# Patient Record
Sex: FEMALE | Race: OTHER | HISPANIC OR LATINO | URBAN - METROPOLITAN AREA
[De-identification: names, ages, dates, MRNs, and addresses within clinical notes are randomized per-mention and may not be internally consistent; named-entity substitution may affect disease eponyms.]

---

## 2024-09-11 ENCOUNTER — INPATIENT (INPATIENT)
Facility: HOSPITAL | Age: 59
LOS: 8 days | Discharge: NURSING, REHAB OR LONG-TERM CARE | DRG: 948 | End: 2024-09-20
Attending: STUDENT IN AN ORGANIZED HEALTH CARE EDUCATION/TRAINING PROGRAM | Admitting: STUDENT IN AN ORGANIZED HEALTH CARE EDUCATION/TRAINING PROGRAM
Payer: MEDICARE

## 2024-09-11 VITALS
SYSTOLIC BLOOD PRESSURE: 149 MMHG | TEMPERATURE: 98 F | OXYGEN SATURATION: 95 % | HEART RATE: 83 BPM | RESPIRATION RATE: 17 BRPM | WEIGHT: 192.9 LBS | HEIGHT: 59 IN | DIASTOLIC BLOOD PRESSURE: 77 MMHG

## 2024-09-11 DIAGNOSIS — E11.9 TYPE 2 DIABETES MELLITUS WITHOUT COMPLICATIONS: ICD-10-CM

## 2024-09-11 DIAGNOSIS — R41.82 ALTERED MENTAL STATUS, UNSPECIFIED: ICD-10-CM

## 2024-09-11 DIAGNOSIS — F25.9 SCHIZOAFFECTIVE DISORDER, UNSPECIFIED: ICD-10-CM

## 2024-09-11 DIAGNOSIS — G93.41 METABOLIC ENCEPHALOPATHY: ICD-10-CM

## 2024-09-11 DIAGNOSIS — Z29.9 ENCOUNTER FOR PROPHYLACTIC MEASURES, UNSPECIFIED: ICD-10-CM

## 2024-09-11 DIAGNOSIS — I10 ESSENTIAL (PRIMARY) HYPERTENSION: ICD-10-CM

## 2024-09-11 LAB
ALBUMIN SERPL ELPH-MCNC: 3 G/DL — LOW (ref 3.5–5)
ALP SERPL-CCNC: 59 U/L — SIGNIFICANT CHANGE UP (ref 40–120)
ALT FLD-CCNC: 25 U/L DA — SIGNIFICANT CHANGE UP (ref 10–60)
ANION GAP SERPL CALC-SCNC: 5 MMOL/L — SIGNIFICANT CHANGE UP (ref 5–17)
APPEARANCE UR: CLEAR — SIGNIFICANT CHANGE UP
APTT BLD: 30.9 SEC — SIGNIFICANT CHANGE UP (ref 24.5–35.6)
AST SERPL-CCNC: 10 U/L — SIGNIFICANT CHANGE UP (ref 10–40)
BASOPHILS # BLD AUTO: 0.03 K/UL — SIGNIFICANT CHANGE UP (ref 0–0.2)
BASOPHILS NFR BLD AUTO: 0.3 % — SIGNIFICANT CHANGE UP (ref 0–2)
BILIRUB SERPL-MCNC: 0.2 MG/DL — SIGNIFICANT CHANGE UP (ref 0.2–1.2)
BILIRUB UR-MCNC: NEGATIVE — SIGNIFICANT CHANGE UP
BUN SERPL-MCNC: 16 MG/DL — SIGNIFICANT CHANGE UP (ref 7–18)
CALCIUM SERPL-MCNC: 9 MG/DL — SIGNIFICANT CHANGE UP (ref 8.4–10.5)
CHLORIDE SERPL-SCNC: 112 MMOL/L — HIGH (ref 96–108)
CO2 SERPL-SCNC: 28 MMOL/L — SIGNIFICANT CHANGE UP (ref 22–31)
COLOR SPEC: YELLOW — SIGNIFICANT CHANGE UP
CREAT SERPL-MCNC: 0.66 MG/DL — SIGNIFICANT CHANGE UP (ref 0.5–1.3)
DIFF PNL FLD: NEGATIVE — SIGNIFICANT CHANGE UP
EGFR: 102 ML/MIN/1.73M2 — SIGNIFICANT CHANGE UP
EOSINOPHIL # BLD AUTO: 0.04 K/UL — SIGNIFICANT CHANGE UP (ref 0–0.5)
EOSINOPHIL NFR BLD AUTO: 0.5 % — SIGNIFICANT CHANGE UP (ref 0–6)
GLUCOSE BLDC GLUCOMTR-MCNC: 114 MG/DL — HIGH (ref 70–99)
GLUCOSE BLDC GLUCOMTR-MCNC: 96 MG/DL — SIGNIFICANT CHANGE UP (ref 70–99)
GLUCOSE SERPL-MCNC: 122 MG/DL — HIGH (ref 70–99)
GLUCOSE UR QL: 500 MG/DL
HCG SERPL-ACNC: <1 MIU/ML — SIGNIFICANT CHANGE UP
HCT VFR BLD CALC: 44 % — SIGNIFICANT CHANGE UP (ref 34.5–45)
HGB BLD-MCNC: 14.8 G/DL — SIGNIFICANT CHANGE UP (ref 11.5–15.5)
IMM GRANULOCYTES NFR BLD AUTO: 0.5 % — SIGNIFICANT CHANGE UP (ref 0–0.9)
INR BLD: 0.97 RATIO — SIGNIFICANT CHANGE UP (ref 0.85–1.18)
KETONES UR-MCNC: NEGATIVE MG/DL — SIGNIFICANT CHANGE UP
LEUKOCYTE ESTERASE UR-ACNC: NEGATIVE — SIGNIFICANT CHANGE UP
LYMPHOCYTES # BLD AUTO: 2.02 K/UL — SIGNIFICANT CHANGE UP (ref 1–3.3)
LYMPHOCYTES # BLD AUTO: 22.8 % — SIGNIFICANT CHANGE UP (ref 13–44)
MAGNESIUM SERPL-MCNC: 1.9 MG/DL — SIGNIFICANT CHANGE UP (ref 1.6–2.6)
MCHC RBC-ENTMCNC: 27.7 PG — SIGNIFICANT CHANGE UP (ref 27–34)
MCHC RBC-ENTMCNC: 33.6 GM/DL — SIGNIFICANT CHANGE UP (ref 32–36)
MCV RBC AUTO: 82.2 FL — SIGNIFICANT CHANGE UP (ref 80–100)
MONOCYTES # BLD AUTO: 0.62 K/UL — SIGNIFICANT CHANGE UP (ref 0–0.9)
MONOCYTES NFR BLD AUTO: 7 % — SIGNIFICANT CHANGE UP (ref 2–14)
NEUTROPHILS # BLD AUTO: 6.1 K/UL — SIGNIFICANT CHANGE UP (ref 1.8–7.4)
NEUTROPHILS NFR BLD AUTO: 68.9 % — SIGNIFICANT CHANGE UP (ref 43–77)
NITRITE UR-MCNC: NEGATIVE — SIGNIFICANT CHANGE UP
NRBC # BLD: 0 /100 WBCS — SIGNIFICANT CHANGE UP (ref 0–0)
PH UR: 7 — SIGNIFICANT CHANGE UP (ref 5–8)
PLATELET # BLD AUTO: 293 K/UL — SIGNIFICANT CHANGE UP (ref 150–400)
POTASSIUM SERPL-MCNC: 3.4 MMOL/L — LOW (ref 3.5–5.3)
POTASSIUM SERPL-SCNC: 3.4 MMOL/L — LOW (ref 3.5–5.3)
PROT SERPL-MCNC: 6.8 G/DL — SIGNIFICANT CHANGE UP (ref 6–8.3)
PROT UR-MCNC: NEGATIVE MG/DL — SIGNIFICANT CHANGE UP
PROTHROM AB SERPL-ACNC: 11.1 SEC — SIGNIFICANT CHANGE UP (ref 9.5–13)
RBC # BLD: 5.35 M/UL — HIGH (ref 3.8–5.2)
RBC # FLD: 16.1 % — HIGH (ref 10.3–14.5)
SODIUM SERPL-SCNC: 145 MMOL/L — SIGNIFICANT CHANGE UP (ref 135–145)
SP GR SPEC: 1.02 — SIGNIFICANT CHANGE UP (ref 1–1.03)
TROPONIN I, HIGH SENSITIVITY RESULT: 5.1 NG/L — SIGNIFICANT CHANGE UP
UROBILINOGEN FLD QL: 0.2 MG/DL — SIGNIFICANT CHANGE UP (ref 0.2–1)
WBC # BLD: 8.85 K/UL — SIGNIFICANT CHANGE UP (ref 3.8–10.5)
WBC # FLD AUTO: 8.85 K/UL — SIGNIFICANT CHANGE UP (ref 3.8–10.5)

## 2024-09-11 PROCEDURE — 99285 EMERGENCY DEPT VISIT HI MDM: CPT

## 2024-09-11 PROCEDURE — 99223 1ST HOSP IP/OBS HIGH 75: CPT | Mod: GC

## 2024-09-11 PROCEDURE — 70450 CT HEAD/BRAIN W/O DYE: CPT | Mod: 26,MC

## 2024-09-11 RX ORDER — ENOXAPARIN SODIUM 100 MG/ML
40 INJECTION SUBCUTANEOUS EVERY 24 HOURS
Refills: 0 | Status: DISCONTINUED | OUTPATIENT
Start: 2024-09-11 | End: 2024-09-20

## 2024-09-11 RX ORDER — FLU VACCINE TS 2012-2013(5YR+) 45MCG/.5ML
0.5 VIAL (ML) INTRAMUSCULAR ONCE
Refills: 0 | Status: DISCONTINUED | OUTPATIENT
Start: 2024-09-11 | End: 2024-09-20

## 2024-09-11 RX ORDER — LOSARTAN POTASSIUM 50 MG/1
1 TABLET ORAL
Refills: 0 | DISCHARGE

## 2024-09-11 RX ORDER — LOSARTAN POTASSIUM 50 MG/1
50 TABLET ORAL DAILY
Refills: 0 | Status: DISCONTINUED | OUTPATIENT
Start: 2024-09-11 | End: 2024-09-20

## 2024-09-11 RX ORDER — POTASSIUM CHLORIDE 10 MEQ
40 TABLET, EXT RELEASE, PARTICLES/CRYSTALS ORAL ONCE
Refills: 0 | Status: COMPLETED | OUTPATIENT
Start: 2024-09-11 | End: 2024-09-11

## 2024-09-11 RX ADMIN — Medication 40 MILLIEQUIVALENT(S): at 16:00

## 2024-09-11 RX ADMIN — Medication 40 MILLIGRAM(S): at 21:54

## 2024-09-11 RX ADMIN — Medication 80 MILLILITER(S): at 17:08

## 2024-09-11 RX ADMIN — ENOXAPARIN SODIUM 40 MILLIGRAM(S): 100 INJECTION SUBCUTANEOUS at 17:08

## 2024-09-11 RX ADMIN — LOSARTAN POTASSIUM 50 MILLIGRAM(S): 50 TABLET ORAL at 17:08

## 2024-09-11 NOTE — H&P ADULT - PROBLEM SELECTOR PLAN 1
p/w worsening confusion for the past two weeks.  Vitals: temp 98.4, HR 83, /77   CTH neg   EKG: NSR   f/u UA  c/w iv fluids as per poor oral intake  likely worsening progression of schizoaffective disorder but rule out urinary retention vs infection.   neurology consulted: Paula   psych consulted: Mynor p/w worsening confusion for the past two weeks.  Vitals: temp 98.4, HR 83, /77   CTH neg   EKG: NSR   f/u UA  c/w iv fluids as per poor oral intake  likely worsening progression of schizoaffective disorder but rule out urinary retention vs infection.   Neurology consulted: Paula   Psych consulted: Mynor NICOLE consulted: As family can no longer take care of the patient   PT consulted: As family reports she is more unbalanced.

## 2024-09-11 NOTE — ED ADULT NURSE NOTE - OBJECTIVE STATEMENT
Patient came to the ED alert but confused and accompanied by grand daughter at the bedside for increased confusion and shaking. As per grand daughter the patient was hospitalized lately for schizophrenic behavior. Blood work was sent and pending urine.

## 2024-09-11 NOTE — ED PROVIDER NOTE - CROS ED GI ALL NEG
negative...
87 y/o female admitted to [facility] for acute on chronic CHF exacerbation with possible concomitant CAP

## 2024-09-11 NOTE — H&P ADULT - NSHPPHYSICALEXAM_GEN_ALL_CORE
T(C): 36.6 (09-11-24 @ 15:28), Max: 36.9 (09-11-24 @ 12:52)  HR: 69 (09-11-24 @ 15:28) (69 - 83)  BP: 127/75 (09-11-24 @ 15:28) (127/75 - 149/77)  RR: 18 (09-11-24 @ 15:28) (17 - 18)  SpO2: 95% (09-11-24 @ 15:28) (95% - 95%)    CONSTITUTIONAL: Well groomed, no apparent distress  EYES: PERRLA and symmetric, EOMI, No conjunctival or scleral injection, non-icteric  RESP: No respiratory distress, no use of accessory muscles; CTA b/l, no WRR  CV: RRR, +S1S2, no MRG; no JVD; no peripheral edema  GI: Soft, NT, ND, no rebound, no guarding; no palpable masses; no hepatosplenomegaly; no hernia palpated  LYMPH: No cervical LAD or tenderness; no axillary LAD or tenderness; no inguinal LAD or tenderness  MSK: Normal gait; No digital clubbing or cyanosis; examination of the (head/neck/spine/ribs/pelvis, RUE, LUE, RLE, LLE) without misalignment,            Normal ROM without pain, no spinal tenderness, normal muscle strength/tone  SKIN: No rashes or ulcers noted; no subcutaneous nodules or induration palpable  NEURO: CN II-XII intact; normal reflexes in upper and lower extremities, sensation intact in upper and lower extremities b/l to light touch   PSYCH: A+O x 1, mood and affect appropriate,

## 2024-09-11 NOTE — ED PROVIDER NOTE - CLINICAL SUMMARY MEDICAL DECISION MAKING FREE TEXT BOX
58-year-old female presents the ED with altered mental status and confusion for the past 2 weeks.  As per daughter patient also not eating or drinking and now soiling herself.  This is a change from her baseline.  Will get labs, head CT, UA, reassess.  Anticipate admission

## 2024-09-11 NOTE — ED ADULT TRIAGE NOTE - CHIEF COMPLAINT QUOTE
shaking and confusion started 2 weeks ago when she was in [place], traveled back to [place] on [place] 5th.

## 2024-09-11 NOTE — H&P ADULT - ATTENDING COMMENTS
58F with a past medical  history of diabetes, hypertension, bipolar, and schizophrenia presents to ED for confusion for the past 2 weeks. Patient is being admitted for metabolic encephelopathy of unknown origin.    #Toxic metabolic encephalopathy - unclear etiology  #Bipolar Schizophrenia  #HTN, DM  History obtained by granddaughter at bedside. Patient with history of bipolar and schizophrenia and has been on oral medication many years ago but has been off of them. She has started to get injections of Paliperidone palmitate 156 Mg/ml at [facility] but with no significant improvement. Patient normal able to verbally communicate and do some ADLs but now minimally verbal but able to follow basic commands and answer yes and no questions. Patient has been soiling herself now. [place] was [place] [facility] [place] a month, reportedly had 2 episodes of seizure like activity in succession but family did not seek medical care at the time. Patient was seen in clinic in [place] who told family to bring patient to hospital. Unclear etiology for worsening mental status but able to follow commands and no focal neurologic deficits on exam. Unclear if any neurologic etiologies, such as encephalitis. May be worsening psychiatric status vs early dementia.   - Neurology consult for further workup  - Psychiatry consulted  - CBC/BMP neg  - obtain UA with straight cath  - continue home HTN, HLD  - PT - needs help with ambulation  - SW/CM consult - family worried about taking care of patient at home  - DVT ppx

## 2024-09-11 NOTE — H&P ADULT - NSICDXPASTMEDICALHX_GEN_ALL_CORE_FT
PAST MEDICAL HISTORY:  Bipolar disorder     Diabetes     HLD (hyperlipidemia)     HTN (hypertension)

## 2024-09-11 NOTE — PATIENT PROFILE ADULT - FALL HARM RISK - HARM RISK INTERVENTIONS

## 2024-09-11 NOTE — ED PROVIDER NOTE - OBJECTIVE STATEMENT
58-year-old female history of diabetes, hypertension, bipolar, schizophrenia presents to ED for confusion for the past 2 weeks.  Patient's granddaughter at bedside to give collateral information.  As per granddaughter the patient was receiving monthly injections at [facility] for her bipolar and schizophrenia.  The family thought the injections were making the patient more confused so she last received an injection in June.  The patient then traveled to [place] and while in [place] unsure if patient had a possible stroke or a seizure.  As shaking episode was noted with confusion.  Family now noting patient is still confused and not eating.  When asked her birthday patient said the wrong day.  Patient AOx0.

## 2024-09-11 NOTE — CHART NOTE - NSCHARTNOTEFT_GEN_A_CORE
EVENT: Notified by RN, pt unable to void. Bladder scan revealed approximately 500 ml of urine in the bladder.     HPI:  58-year-old female, AAOx1, ambulates with assistance, with a past medical  history of diabetes, hypertension, bipolar, and schizophrenia presents to ED for confusion for the past 2 weeks.  vitals wnl. Labs wnl. CTH neg. Admitted for metabolic encephelopathy of unknown origin but likely from worsening of her psychiatric condition.     SUBJECTIVE: Unable to assess due to impaired cognition     OBJECTIVE:  Vital Signs Last 24 Hrs  T(C): 36.6 (11 Sep 2024 17:02), Max: 36.9 (11 Sep 2024 12:52)  T(F): 97.9 (11 Sep 2024 17:02), Max: 98.4 (11 Sep 2024 12:52)  HR: 82 (11 Sep 2024 19:15) (69 - 83)  BP: 143/77 (11 Sep 2024 19:15) (124/72 - 149/77)  BP(mean): --  RR: 18 (11 Sep 2024 17:02) (17 - 18)  SpO2: 95% (11 Sep 2024 19:15) (95% - 96%)    Parameters below as of 11 Sep 2024 17:02  Patient On (Oxygen Delivery Method): room air    PHYSICAL EXAM:  Neuro: Awake and alert x1  Cardiovascular: + S1, S2, no murmurs, rubs, or bruits  Respiratory: clear to auscultation bilaterally with good air entry   GI: Abdomen soft, non-tender, bowel sounds present   : distended   Skin: warm and dry; no rash      LABS:                        14.8   8.85  )-----------( 293      ( 11 Sep 2024 14:19 )             44.0     09-11    145  |  112<H>  |  16  ----------------------------<  122<H>  3.4<L>   |  28  |  0.66    Ca    9.0      11 Sep 2024 14:19  Mg     1.9     09-11    TPro  6.8  /  Alb  3.0<L>  /  TBili  0.2  /  DBili  x   /  AST  10  /  ALT  25  /  AlkPhos  59  09-11      EKG:   IMAGING:    ASSESSMENT/PROBLEM: Urinary retention    PLAN:     -Straight cath x 1  -Repeat bladder scan in 6-8 hours, next bladder scan at 0300  -Continue current/supportive measures     FOLLOW UP / RESULT:    -f/u repeat bladder scan results EVENT: Notified by RN, pt unable to void. Bladder scan revealed approximately 500 ml of urine in the bladder.     HPI:  58-year-old female, AAOx1, ambulates with assistance, with a past medical  history of diabetes, hypertension, bipolar, and schizophrenia presents to ED for confusion for the past 2 weeks.  vitals wnl. Labs wnl. CTH neg. Admitted for metabolic encephelopathy of unknown origin but likely from worsening of her psychiatric condition.     SUBJECTIVE: Unable to assess due to impaired cognition     OBJECTIVE:  Vital Signs Last 24 Hrs  T(C): 36.6 (11 Sep 2024 17:02), Max: 36.9 (11 Sep 2024 12:52)  T(F): 97.9 (11 Sep 2024 17:02), Max: 98.4 (11 Sep 2024 12:52)  HR: 82 (11 Sep 2024 19:15) (69 - 83)  BP: 143/77 (11 Sep 2024 19:15) (124/72 - 149/77)  BP(mean): --  RR: 18 (11 Sep 2024 17:02) (17 - 18)  SpO2: 95% (11 Sep 2024 19:15) (95% - 96%)    Parameters below as of 11 Sep 2024 17:02  Patient On (Oxygen Delivery Method): room air    PHYSICAL EXAM:  Neuro: Awake and alert x1  Cardiovascular: + S1, S2, no murmurs, rubs, or bruits  Respiratory: clear to auscultation bilaterally with good air entry   GI: Abdomen soft, non-tender, bowel sounds present   : distended   Skin: warm and dry; no rash      LABS:                        14.8   8.85  )-----------( 293      ( 11 Sep 2024 14:19 )             44.0     09-11    145  |  112<H>  |  16  ----------------------------<  122<H>  3.4<L>   |  28  |  0.66    Ca    9.0      11 Sep 2024 14:19  Mg     1.9     09-11    TPro  6.8  /  Alb  3.0<L>  /  TBili  0.2  /  DBili  x   /  AST  10  /  ALT  25  /  AlkPhos  59  09-11      EKG:   IMAGING:    ASSESSMENT/PROBLEM: Urinary retention    PLAN:     -Straight cath x 1  -Repeat bladder scan in 6-8 hours, next bladder scan at 0300  -Continue current/supportive measures     FOLLOW UP / RESULT:    -f/u repeat bladder scan results    ADDENDUM: Repeat bladder scan showed approximately 724 ml of urine in bladder. Straight cath now. Repeat bladder scan in 6-8 hours if greater still retaining, consider leaving yeager in place. EVENT: Notified by RN, pt unable to void. Bladder scan revealed approximately 500 ml of urine in the bladder.     HPI:  58-year-old female, AAOx1, ambulates with assistance, with a past medical  history of diabetes, hypertension, bipolar, and schizophrenia presents to ED for confusion for the past 2 weeks.  vitals wnl. Labs wnl. CTH neg. Admitted for metabolic encephelopathy of unknown origin but likely from worsening of her psychiatric condition.     SUBJECTIVE: Unable to assess due to impaired cognition     OBJECTIVE:  Vital Signs Last 24 Hrs  T(C): 36.6 (11 Sep 2024 17:02), Max: 36.9 (11 Sep 2024 12:52)  T(F): 97.9 (11 Sep 2024 17:02), Max: 98.4 (11 Sep 2024 12:52)  HR: 82 (11 Sep 2024 19:15) (69 - 83)  BP: 143/77 (11 Sep 2024 19:15) (124/72 - 149/77)  BP(mean): --  RR: 18 (11 Sep 2024 17:02) (17 - 18)  SpO2: 95% (11 Sep 2024 19:15) (95% - 96%)    Parameters below as of 11 Sep 2024 17:02  Patient On (Oxygen Delivery Method): room air    PHYSICAL EXAM:  Neuro: Awake and alert x1  Cardiovascular: + S1, S2, no murmurs, rubs, or bruits  Respiratory: clear to auscultation bilaterally with good air entry   GI: Abdomen soft, non-tender, bowel sounds present   : distended   Skin: warm and dry; no rash      LABS:                        14.8   8.85  )-----------( 293      ( 11 Sep 2024 14:19 )             44.0     09-11    145  |  112<H>  |  16  ----------------------------<  122<H>  3.4<L>   |  28  |  0.66    Ca    9.0      11 Sep 2024 14:19  Mg     1.9     09-11    TPro  6.8  /  Alb  3.0<L>  /  TBili  0.2  /  DBili  x   /  AST  10  /  ALT  25  /  AlkPhos  59  09-11      EKG:   IMAGING:    ASSESSMENT/PROBLEM: Urinary retention    PLAN:     -Straight cath x 1  -Repeat bladder scan in 6-8 hours, next bladder scan at 0300  -Continue current/supportive measures     FOLLOW UP / RESULT:    -f/u repeat bladder scan results    ADDENDUM: Repeat bladder scan showed approximately 724 ml of urine in bladder. Per RN, pt ambulated to bathroom with assist, voided independently. Repeat bladder scan showed 174 ml of urine in bladder. Next scan at 0900.

## 2024-09-11 NOTE — ED ADULT NURSE NOTE - NSFALLUNIVINTERV_ED_ALL_ED
Bed/Stretcher in lowest position, wheels locked, appropriate side rails in place/Call bell, personal items and telephone in reach/Instruct patient to call for assistance before getting out of bed/chair/stretcher/Non-slip footwear applied when patient is off stretcher/[place] to call system/Physically safe environment - no spills, clutter or unnecessary equipment/Purposeful proactive rounding/Room/bathroom lighting operational, light cord in reach

## 2024-09-11 NOTE — H&P ADULT - HISTORY OF PRESENT ILLNESS
58-year-old female, AAOx1, ambulates with assistance, with a past medical  history of diabetes, hypertension, bipolar, and schizophrenia presents to ED for confusion for the past 2 weeks.  Patient's granddaughter at bedside reports the patient  was receiving monthly injections ( Paliperidone palmitate 156 Mg/ml)  at [facility] for her bipolar and schizophrenia.  The family thought the injections were making the patient more confused so last injection was in June.  The patient then traveled to [place] and while in [facility] the patient may have a possible stroke or a seizure.  There was a  witnessed shaking episode with noted confusion afterwards.  Family now noting patient is still confused and is not eating well since that episode two weeks. . She is not going to the bathroom by her self.  She will not urinate often and will soil herself.  Family took the patient to her doctor for a check up and when discussing these symptoms, they were advised to take the patient to the hospital Patient denies any Ha, SOB. CP, abdominal pain, n/v, diarrhea or dysuria.     ED course:  Vitals: temp 98.4, HR 83, /77   CTH neg   EKG: NSR

## 2024-09-11 NOTE — PATIENT PROFILE ADULT - DO YOU FEEL UNSAFE AT HOME, WORK, OR SCHOOL?
-- DO NOT REPLY / DO NOT REPLY ALL --  -- Message is from Engagement Center Operations (ECO) --    General Patient Message: Patient is requesting a callback in regards to lab word done 3/22/2023.     Caller Information       Type Contact Phone/Fax    03/29/2023 01:49 PM CDT Phone (Incoming) Sheila Ding (Self) [phone] (M)        Alternative phone number: n/a    Can a detailed message be left? Yes    Message Turnaround: [place]:    Refer to site's KB page for routing instructions    Please give this turnaround time to the caller:   \"You can expect to receive a response 1-3 business days after your provider's clinical team reviews the message\"              
Lab results conveyed, Dr. Estrada's message from 03/28 TE relayed. Patient verbalized understanding. No further questions at this time.     CHASE Good  
RN attempted to contact patient. No answer, left patient a generalized voicemail message. Call back number provided.     CHASE Good  
no

## 2024-09-11 NOTE — H&P ADULT - ASSESSMENT
58-year-old female, AAOx1, ambulates with assistance, with a past medical  history of diabetes, hypertension, bipolar, and schizophrenia presents to ED for confusion for the past 2 weeks.  vitals wnl. Labs wnl. CTH neg. Patient is being admitted for metabolic encephelopathy of unknown origin but likely from worsening of her psychiatric condition.

## 2024-09-11 NOTE — H&P ADULT - NSHPREVIEWOFSYSTEMS_GEN_ALL_CORE
REVIEW OF SYSTEMS:    CONSTITUTIONAL: No fever, chills, or weakness.   EYES/ENT: No visual changes;  No ear pain, runny nose, or sore throat.   NECK: No pain or stiffness.  RESPIRATORY: No cough, wheezing, hemoptysis; No shortness of breath.  CARDIOVASCULAR: No chest pain, dyspnea on exertion, or palpitations.  GASTROINTESTINAL: No abdominal or epigastric pain. No nausea, vomiting, or hematemesis; No diarrhea or constipation. No melena or hematochezia.  GENITOURINARY: No dysuria, frequency or hematuria.  NEUROLOGICAL: No numbness or weakness. + intermittent dizziness   SKIN: No itching, rashes.

## 2024-09-12 DIAGNOSIS — Z75.8 OTHER PROBLEMS RELATED TO MEDICAL FACILITIES AND OTHER HEALTH CARE: ICD-10-CM

## 2024-09-12 LAB
A1C WITH ESTIMATED AVERAGE GLUCOSE RESULT: 6.4 % — HIGH (ref 4–5.6)
ALBUMIN SERPL ELPH-MCNC: 3 G/DL — LOW (ref 3.5–5)
ALP SERPL-CCNC: 59 U/L — SIGNIFICANT CHANGE UP (ref 40–120)
ALT FLD-CCNC: 23 U/L DA — SIGNIFICANT CHANGE UP (ref 10–60)
ANION GAP SERPL CALC-SCNC: 7 MMOL/L — SIGNIFICANT CHANGE UP (ref 5–17)
AST SERPL-CCNC: 11 U/L — SIGNIFICANT CHANGE UP (ref 10–40)
BILIRUB SERPL-MCNC: 0.4 MG/DL — SIGNIFICANT CHANGE UP (ref 0.2–1.2)
BUN SERPL-MCNC: 11 MG/DL — SIGNIFICANT CHANGE UP (ref 7–18)
CALCIUM SERPL-MCNC: 9 MG/DL — SIGNIFICANT CHANGE UP (ref 8.4–10.5)
CHLORIDE SERPL-SCNC: 109 MMOL/L — HIGH (ref 96–108)
CO2 SERPL-SCNC: 26 MMOL/L — SIGNIFICANT CHANGE UP (ref 22–31)
CREAT SERPL-MCNC: 0.57 MG/DL — SIGNIFICANT CHANGE UP (ref 0.5–1.3)
EGFR: 105 ML/MIN/1.73M2 — SIGNIFICANT CHANGE UP
ESTIMATED AVERAGE GLUCOSE: 137 MG/DL — HIGH (ref 68–114)
GLUCOSE BLDC GLUCOMTR-MCNC: 109 MG/DL — HIGH (ref 70–99)
GLUCOSE BLDC GLUCOMTR-MCNC: 128 MG/DL — HIGH (ref 70–99)
GLUCOSE BLDC GLUCOMTR-MCNC: 152 MG/DL — HIGH (ref 70–99)
GLUCOSE SERPL-MCNC: 127 MG/DL — HIGH (ref 70–99)
HCT VFR BLD CALC: 44.1 % — SIGNIFICANT CHANGE UP (ref 34.5–45)
HGB BLD-MCNC: 14.8 G/DL — SIGNIFICANT CHANGE UP (ref 11.5–15.5)
MCHC RBC-ENTMCNC: 27.5 PG — SIGNIFICANT CHANGE UP (ref 27–34)
MCHC RBC-ENTMCNC: 33.6 GM/DL — SIGNIFICANT CHANGE UP (ref 32–36)
MCV RBC AUTO: 82 FL — SIGNIFICANT CHANGE UP (ref 80–100)
NRBC # BLD: 0 /100 WBCS — SIGNIFICANT CHANGE UP (ref 0–0)
PLATELET # BLD AUTO: 285 K/UL — SIGNIFICANT CHANGE UP (ref 150–400)
POTASSIUM SERPL-MCNC: 3.7 MMOL/L — SIGNIFICANT CHANGE UP (ref 3.5–5.3)
POTASSIUM SERPL-SCNC: 3.7 MMOL/L — SIGNIFICANT CHANGE UP (ref 3.5–5.3)
PROT SERPL-MCNC: 6.5 G/DL — SIGNIFICANT CHANGE UP (ref 6–8.3)
RBC # BLD: 5.38 M/UL — HIGH (ref 3.8–5.2)
RBC # FLD: 15.9 % — HIGH (ref 10.3–14.5)
SODIUM SERPL-SCNC: 142 MMOL/L — SIGNIFICANT CHANGE UP (ref 135–145)
WBC # BLD: 7.18 K/UL — SIGNIFICANT CHANGE UP (ref 3.8–10.5)
WBC # FLD AUTO: 7.18 K/UL — SIGNIFICANT CHANGE UP (ref 3.8–10.5)

## 2024-09-12 PROCEDURE — 90792 PSYCH DIAG EVAL W/MED SRVCS: CPT

## 2024-09-12 PROCEDURE — 99232 SBSQ HOSP IP/OBS MODERATE 35: CPT

## 2024-09-12 RX ORDER — PALIPERIDONE 3 MG/1
3 TABLET, EXTENDED RELEASE ORAL DAILY
Refills: 0 | Status: DISCONTINUED | OUTPATIENT
Start: 2024-09-12 | End: 2024-09-15

## 2024-09-12 RX ORDER — DIVALPROEX SODIUM 125 MG/1
500 CAPSULE, DELAYED RELEASE ORAL AT BEDTIME
Refills: 0 | Status: DISCONTINUED | OUTPATIENT
Start: 2024-09-12 | End: 2024-09-20

## 2024-09-12 RX ORDER — OLANZAPINE 7.5 MG/1
2.5 TABLET ORAL EVERY 6 HOURS
Refills: 0 | Status: DISCONTINUED | OUTPATIENT
Start: 2024-09-12 | End: 2024-09-20

## 2024-09-12 RX ADMIN — ENOXAPARIN SODIUM 40 MILLIGRAM(S): 100 INJECTION SUBCUTANEOUS at 17:17

## 2024-09-12 RX ADMIN — OLANZAPINE 2.5 MILLIGRAM(S): 7.5 TABLET ORAL at 22:00

## 2024-09-12 RX ADMIN — Medication 1: at 11:36

## 2024-09-12 RX ADMIN — LOSARTAN POTASSIUM 50 MILLIGRAM(S): 50 TABLET ORAL at 05:48

## 2024-09-12 RX ADMIN — Medication 40 MILLIGRAM(S): at 21:18

## 2024-09-12 RX ADMIN — DIVALPROEX SODIUM 500 MILLIGRAM(S): 125 CAPSULE, DELAYED RELEASE ORAL at 21:18

## 2024-09-12 NOTE — BH CONSULTATION LIAISON ASSESSMENT NOTE - NSICDXBHSECONDARYDX_PSY_ALL_CORE
Metabolic encephalopathy   G93.41  Chronic schizoaffective disorder   F25.9  HTN (hypertension)   I10  HLD (hyperlipidemia)   E78.5  DM (diabetes mellitus)   E11.9  Prophylactic measure   Z29.9

## 2024-09-12 NOTE — BH CONSULTATION LIAISON ASSESSMENT NOTE - OTHER PAST PSYCHIATRIC HISTORY (INCLUDE DETAILS REGARDING ONSET, COURSE OF ILLNESS, INPATIENT/OUTPATIENT TREATMENT)
Hx of schizophrenia followed at the [facility]. A recent psychiatric hospitalization at [facility], as per family about three months ago. Was discharged on Invega Sustenna 156 mg IM, Invega PO 12 mg PO for 10 days, and Depakote 500 mg PO HS. As per family, last decanoate shot was on June 28, and patient was continued on an unclear PO dosage. Has previous psychiatric hospitalizations, 2-3 more as per patient. No past SA's.

## 2024-09-12 NOTE — BH CONSULTATION LIAISON ASSESSMENT NOTE - CURRENT MEDICATION
MEDICATIONS  (STANDING):  atorvastatin 40 milliGRAM(s) Oral at bedtime  divalproex  milliGRAM(s) Oral at bedtime  enoxaparin Injectable 40 milliGRAM(s) SubCutaneous every 24 hours  influenza   Vaccine 0.5 milliLiter(s) IntraMuscular once  insulin lispro (ADMELOG) corrective regimen sliding scale   SubCutaneous at bedtime  insulin lispro (ADMELOG) corrective regimen sliding scale   SubCutaneous three times a day before meals  lactated ringers. 1000 milliLiter(s) (80 mL/Hr) IV Continuous <Continuous>  losartan 50 milliGRAM(s) Oral daily  paliperidone ER. 3 milliGRAM(s) Oral daily    MEDICATIONS  (PRN):

## 2024-09-12 NOTE — BH CONSULTATION LIAISON ASSESSMENT NOTE - NSBHCHARTREVIEWVS_PSY_A_CORE FT
Vital Signs Last 24 Hrs  T(C): 36.8 (12 Sep 2024 12:41), Max: 37 (12 Sep 2024 05:10)  T(F): 98.2 (12 Sep 2024 12:41), Max: 98.6 (12 Sep 2024 05:10)  HR: 79 (12 Sep 2024 12:41) (67 - 82)  BP: 144/78 (12 Sep 2024 12:41) (124/72 - 159/81)  BP(mean): 96 (11 Sep 2024 20:10) (96 - 96)  RR: 18 (12 Sep 2024 12:41) (17 - 18)  SpO2: 95% (12 Sep 2024 12:41) (95% - 96%)    Parameters below as of 12 Sep 2024 12:41  Patient On (Oxygen Delivery Method): room air

## 2024-09-12 NOTE — BH CONSULTATION LIAISON ASSESSMENT NOTE - RISK ASSESSMENT
Elevated risk given her underlying psychiatrist hx, poor compliance and past hospitalizations. Has no past SA's though. Low acute risk, elevated chronic risk.

## 2024-09-12 NOTE — BH CONSULTATION LIAISON ASSESSMENT NOTE - HPI (INCLUDE ILLNESS QUALITY, SEVERITY, DURATION, TIMING, CONTEXT, MODIFYING FACTORS, ASSOCIATED SIGNS AND SYMPTOMS)
57 y/o F with a hx of schizophrenia, HTN, and DM2, who was admitted due to confusion. She is consulted for the same reason. As per patient and grand daughter, she suffers from schizophrenia vs Bipolar disorder and had been stabilized on PO Invega. However, given the patient's questionable compliance, her psychiatrist at the [facility] transitioned her to Invega Sustenna. Patient says that she started having side-effects afterwards including confusion and tremors and decided to stop it and then travelled to the . They reports recently returning from  and the patient's behavior has worsened. Patient endorses confusion and is oriented to person and partially to place and time. Says that her psychiatrist gave her PO medications but that she has not taken them either. As per patient, her last Sustenna injection was on June 28, and her last use of PO medication was a month ago. She denies any SI, HI or AVH.

## 2024-09-12 NOTE — PROGRESS NOTE ADULT - NS ATTEND AMEND GEN_ALL_CORE FT
58F PMH bipolar and schizophrenia on monthly paliperidone injections at [facility] p/w 2 weeks of confusion/inability to perform ADLs. Pt reports several episodes of acute agitation and confusion, most recently requiring hospitalization at [facility]. Admission for encephalopathy.    AP  Encephalopathy   Suspect early dementia or decompensated schizophrenia  Schizophrenia  Bipolar  HTN  DM    -Patient’s episode sound like agitation possibly iso of decompensated schizophrenia. After most recent episode, daughter reports she has been laying in bed, soiling herself, and unable to perform normal ADLs. On exam, she is cooperative, AOx2, following commands and answering qs appropriately.  -discussed with psych Dr Mcdermott: appears to be decompensated schizoaffective disorder due to medication non-adherence - resume paliperidone (invega) at 3mg PO.   -resume home depakote 500mg at bedtime  -f/u neuro recs  -resume home meds  -SW, CM eval  -dvt ppx    -pending placement to NH    =================================  I independently reviewed the following tests: N/A  I discussed management with specialists and the plan of care is described above.  I ordered labs and studies: CBC, BMP  I reviewed the following labs to guide my management:                        14.8   8.93  )-----------( 290      ( 13 Sep 2024 05:40 )             45.0     09-13    143  |  110<H>  |  18  ----------------------------<  111<H>  3.7   |  27  |  0.71    Ca    8.8      13 Sep 2024 05:40    TPro  6.5  /  Alb  3.0<L>  /  TBili  0.4  /  DBili  x   /  AST  11  /  ALT  23  /  AlkPhos  59  09-12

## 2024-09-12 NOTE — PROGRESS NOTE ADULT - SUBJECTIVE AND OBJECTIVE BOX
NP Note discussed with  primary attending    Patient is a 58y old  Female who presents with a chief complaint of altered mental status (11 Sep 2024 15:52)      INTERVAL HPI/OVERNIGHT EVENTS: no new complaints    MEDICATIONS  (STANDING):  atorvastatin 40 milliGRAM(s) Oral at bedtime  divalproex  milliGRAM(s) Oral at bedtime  enoxaparin Injectable 40 milliGRAM(s) SubCutaneous every 24 hours  influenza   Vaccine 0.5 milliLiter(s) IntraMuscular once  insulin lispro (ADMELOG) corrective regimen sliding scale   SubCutaneous three times a day before meals  insulin lispro (ADMELOG) corrective regimen sliding scale   SubCutaneous at bedtime  lactated ringers. 1000 milliLiter(s) (80 mL/Hr) IV Continuous <Continuous>  losartan 50 milliGRAM(s) Oral daily  paliperidone ER. 3 milliGRAM(s) Oral daily    MEDICATIONS  (PRN):      __________________________________________________  REVIEW OF SYSTEMS:    CONSTITUTIONAL: No fever,   EYES: no acute visual disturbances  NECK: No pain or stiffness  RESPIRATORY: No cough; No shortness of breath  CARDIOVASCULAR: No chest pain, no palpitations  GASTROINTESTINAL: No pain. No nausea or vomiting; No diarrhea   NEUROLOGICAL: No headache or numbness, no tremors  MUSCULOSKELETAL: No joint pain, no muscle pain  GENITOURINARY: no dysuria, no frequency, no hesitancy  PSYCHIATRY: no depression , no anxiety  ALL OTHER  ROS negative        Vital Signs Last 24 Hrs  T(C): 36.8 (12 Sep 2024 12:41), Max: 37 (12 Sep 2024 05:10)  T(F): 98.2 (12 Sep 2024 12:41), Max: 98.6 (12 Sep 2024 05:10)  HR: 79 (12 Sep 2024 12:41) (67 - 82)  BP: 144/78 (12 Sep 2024 12:41) (124/72 - 159/81)  BP(mean): 96 (11 Sep 2024 20:10) (96 - 96)  RR: 18 (12 Sep 2024 12:41) (17 - 18)  SpO2: 95% (12 Sep 2024 12:41) (95% - 96%)    Parameters below as of 12 Sep 2024 12:41  Patient On (Oxygen Delivery Method): room air        ________________________________________________  PHYSICAL EXAM:  GENERAL: NAD  HEENT: Normocephalic;  conjunctivae and sclerae clear; moist mucous membranes;   NECK : supple  CHEST/LUNG: Clear to ausculitation bilaterally with good air entry   HEART: S1 S2  regular; no murmurs, gallops or rubs  ABDOMEN: Soft, Nontender, Nondistended; Bowel sounds present  EXTREMITIES: no cyanosis; no edema; no calf tenderness  SKIN: warm and dry; no rash  NERVOUS SYSTEM:  A&Ox2    _________________________________________________  LABS:                        14.8   7.18  )-----------( 285      ( 12 Sep 2024 05:15 )             44.1     09-12    142  |  109<H>  |  11  ----------------------------<  127<H>  3.7   |  26  |  0.57    Ca    9.0      12 Sep 2024 05:15  Mg     1.9     09-11    TPro  6.5  /  Alb  3.0<L>  /  TBili  0.4  /  DBili  x   /  AST  11  /  ALT  23  /  AlkPhos  59  09-12    PT/INR - ( 11 Sep 2024 14:19 )   PT: 11.1 sec;   INR: 0.97 ratio         PTT - ( 11 Sep 2024 14:19 )  PTT:30.9 sec  Urinalysis Basic - ( 12 Sep 2024 05:15 )    Color: x / Appearance: x / SG: x / pH: x  Gluc: 127 mg/dL / Ketone: x  / Bili: x / Urobili: x   Blood: x / Protein: x / Nitrite: x   Leuk Esterase: x / RBC: x / WBC x   Sq Epi: x / Non Sq Epi: x / Bacteria: x      CAPILLARY BLOOD GLUCOSE      POCT Blood Glucose.: 152 mg/dL (12 Sep 2024 11:25)  POCT Blood Glucose.: 114 mg/dL (11 Sep 2024 21:16)  POCT Blood Glucose.: 96 mg/dL (11 Sep 2024 16:13)        RADIOLOGY & ADDITIONAL TESTS:  < from: CT Head No Cont (09.11.24 @ 14:44) >  IMPRESSION:    1)  unremarkable CT study of the brain  2)  clear sinuses and mastoids..      < end of copied text >    Imaging Personally Reviewed:  YES    Consultant(s) Notes Reviewed:   YES    Care Discussed with Consultants :     Plan of care was discussed with patient and /or primary care giver; all questions and concerns were addressed and care was aligned with patient's wishes.

## 2024-09-12 NOTE — BH CONSULTATION LIAISON ASSESSMENT NOTE - SUMMARY
59 y/o F with a hx of schizophrenia, HTN, and DM2, who was admitted due to confusion. She is consulted for the same reason. Patient with a mild to moderate decompensation of her underlying psychotic illness. She should be restarted on her outpatient medications for further stabilization. At the moment she does not present an imiinet threat to herself or to others, so an involuntary inpatient psychiatric admission is not indicated. She is not suicidal or homicidal.    -Would restart Invega 3 mg PO daily and Depakote 500 mg PO HS  -PRN: Zyprexa 2.5 mg PO/IM q 6 hrs PRN for moderate to severe agitation  -No criteria for an inpatient psychiatric admission; observation status as per primary team  -Monitor her QTc  -Should f/u with her outpatient psychiatrist  -COREY f/u  -Case discussed with the primary team  -Will follow as needed

## 2024-09-12 NOTE — PROGRESS NOTE ADULT - PROBLEM SELECTOR PLAN 2
hx of schizoaffective disorder   receiving Paliperidone palmitate 156 Mg/ml at home  Start Paliperidone 3mg daily > unable in our pharmacy (Pharmacy ordered from [facility])  MD Dr. East aware

## 2024-09-12 NOTE — BH CONSULTATION LIAISON ASSESSMENT NOTE - NSBHCHARTREVIEWLAB_PSY_A_CORE FT
CBC Full  -  ( 12 Sep 2024 05:15 )  WBC Count : 7.18 K/uL  RBC Count : 5.38 M/uL  Hemoglobin : 14.8 g/dL  Hematocrit : 44.1 %  Platelet Count - Automated : 285 K/uL  Mean Cell Volume : 82.0 fl  Mean Cell Hemoglobin : 27.5 pg  Mean Cell Hemoglobin Concentration : 33.6 gm/dL  Auto Neutrophil # : x  Auto Lymphocyte # : x  Auto Monocyte # : x  Auto Eosinophil # : x  Auto Basophil # : x  Auto Neutrophil % : x  Auto Lymphocyte % : x  Auto Monocyte % : x  Auto Eosinophil % : x  Auto Basophil % : x  09-12    142  |  109<H>  |  11  ----------------------------<  127<H>  3.7   |  26  |  0.57    Ca    9.0      12 Sep 2024 05:15  Mg     1.9     09-11    TPro  6.5  /  Alb  3.0<L>  /  TBili  0.4  /  DBili  x   /  AST  11  /  ALT  23  /  AlkPhos  59  09-12

## 2024-09-13 LAB
ANION GAP SERPL CALC-SCNC: 6 MMOL/L — SIGNIFICANT CHANGE UP (ref 5–17)
BUN SERPL-MCNC: 18 MG/DL — SIGNIFICANT CHANGE UP (ref 7–18)
CALCIUM SERPL-MCNC: 8.8 MG/DL — SIGNIFICANT CHANGE UP (ref 8.4–10.5)
CHLORIDE SERPL-SCNC: 110 MMOL/L — HIGH (ref 96–108)
CO2 SERPL-SCNC: 27 MMOL/L — SIGNIFICANT CHANGE UP (ref 22–31)
CREAT SERPL-MCNC: 0.71 MG/DL — SIGNIFICANT CHANGE UP (ref 0.5–1.3)
EGFR: 98 ML/MIN/1.73M2 — SIGNIFICANT CHANGE UP
GLUCOSE BLDC GLUCOMTR-MCNC: 108 MG/DL — HIGH (ref 70–99)
GLUCOSE BLDC GLUCOMTR-MCNC: 110 MG/DL — HIGH (ref 70–99)
GLUCOSE BLDC GLUCOMTR-MCNC: 124 MG/DL — HIGH (ref 70–99)
GLUCOSE BLDC GLUCOMTR-MCNC: 132 MG/DL — HIGH (ref 70–99)
GLUCOSE SERPL-MCNC: 111 MG/DL — HIGH (ref 70–99)
HCT VFR BLD CALC: 45 % — SIGNIFICANT CHANGE UP (ref 34.5–45)
HGB BLD-MCNC: 14.8 G/DL — SIGNIFICANT CHANGE UP (ref 11.5–15.5)
MCHC RBC-ENTMCNC: 27.5 PG — SIGNIFICANT CHANGE UP (ref 27–34)
MCHC RBC-ENTMCNC: 32.9 GM/DL — SIGNIFICANT CHANGE UP (ref 32–36)
MCV RBC AUTO: 83.5 FL — SIGNIFICANT CHANGE UP (ref 80–100)
NRBC # BLD: 0 /100 WBCS — SIGNIFICANT CHANGE UP (ref 0–0)
PLATELET # BLD AUTO: 290 K/UL — SIGNIFICANT CHANGE UP (ref 150–400)
POTASSIUM SERPL-MCNC: 3.7 MMOL/L — SIGNIFICANT CHANGE UP (ref 3.5–5.3)
POTASSIUM SERPL-SCNC: 3.7 MMOL/L — SIGNIFICANT CHANGE UP (ref 3.5–5.3)
RBC # BLD: 5.39 M/UL — HIGH (ref 3.8–5.2)
RBC # FLD: 16.4 % — HIGH (ref 10.3–14.5)
SODIUM SERPL-SCNC: 143 MMOL/L — SIGNIFICANT CHANGE UP (ref 135–145)
WBC # BLD: 8.93 K/UL — SIGNIFICANT CHANGE UP (ref 3.8–10.5)
WBC # FLD AUTO: 8.93 K/UL — SIGNIFICANT CHANGE UP (ref 3.8–10.5)

## 2024-09-13 PROCEDURE — 99233 SBSQ HOSP IP/OBS HIGH 50: CPT

## 2024-09-13 RX ADMIN — PALIPERIDONE 3 MILLIGRAM(S): 3 TABLET, EXTENDED RELEASE ORAL at 11:04

## 2024-09-13 RX ADMIN — Medication 40 MILLIGRAM(S): at 21:20

## 2024-09-13 RX ADMIN — LOSARTAN POTASSIUM 50 MILLIGRAM(S): 50 TABLET ORAL at 05:08

## 2024-09-13 RX ADMIN — DIVALPROEX SODIUM 500 MILLIGRAM(S): 125 CAPSULE, DELAYED RELEASE ORAL at 21:20

## 2024-09-13 RX ADMIN — ENOXAPARIN SODIUM 40 MILLIGRAM(S): 100 INJECTION SUBCUTANEOUS at 17:19

## 2024-09-13 NOTE — BH CONSULTATION LIAISON PROGRESS NOTE - NSBHCHARTREVIEWLAB_PSY_A_CORE FT
CBC Full  -  ( 13 Sep 2024 05:40 )  WBC Count : 8.93 K/uL  RBC Count : 5.39 M/uL  Hemoglobin : 14.8 g/dL  Hematocrit : 45.0 %  Platelet Count - Automated : 290 K/uL  Mean Cell Volume : 83.5 fl  Mean Cell Hemoglobin : 27.5 pg  Mean Cell Hemoglobin Concentration : 32.9 gm/dL  Auto Neutrophil # : x  Auto Lymphocyte # : x  Auto Monocyte # : x  Auto Eosinophil # : x  Auto Basophil # : x  Auto Neutrophil % : x  Auto Lymphocyte % : x  Auto Monocyte % : x  Auto Eosinophil % : x  Auto Basophil % : x  09-13    143  |  110<H>  |  18  ----------------------------<  111<H>  3.7   |  27  |  0.71    Ca    8.8      13 Sep 2024 05:40    TPro  6.5  /  Alb  3.0<L>  /  TBili  0.4  /  DBili  x   /  AST  11  /  ALT  23  /  AlkPhos  59  09-12

## 2024-09-13 NOTE — DISCHARGE NOTE PROVIDER - NSDCCPCAREPLAN_GEN_ALL_CORE_FT
PRINCIPAL DISCHARGE DIAGNOSIS  Diagnosis: Metabolic encephalopathy  Assessment and Plan of Treatment: You presented with worsening confusion likely due to worsening psychiatric condition. All infectious workup were negative. Cat scan of head was also negative for any acute findings. You were evaluated by psychiatrist, & your psychiatric medications were restarted.   Follow-up with your primary care physician.  Call your physician and or go to ER if you develop change in mental status, headache, focal weakness, difficulty speaking /swallowing, facial droop.        SECONDARY DISCHARGE DIAGNOSES  Diagnosis: Chronic schizoaffective disorder  Assessment and Plan of Treatment: You have a history of chronic schizoaffective disorder. Continue to take your medications as prescribed. Follow up with your PCP & Psychiatrist.    Diagnosis: HTN (hypertension)  Assessment and Plan of Treatment: You have a history of high blood pressure on blood pressure medication for your b/p mangement. Continue to take your medication as prescribed.  Maintain a Low salt diet  Engage in activity as tolerated.  Take all medication as prescribed.  Follow up with your medical doctor for routine blood pressure monitoring at your next visit.  Notify your doctor if you have any of the following symptoms:   Dizziness, Lightheadedness, Blurry vision, Headache, Chest pain, Shortness of breath      Diagnosis: DM (diabetes mellitus)  Assessment and Plan of Treatment: You have a history of diabetes. Your recent A1C was 6.4.  Continue to adhere with your diabetic regimen  Follow up with your primary doctor    Diagnosis: HLD (hyperlipidemia)  Assessment and Plan of Treatment: Continue to take your Cholesterole medication as prescribed  Follow up with your primary care doctor.     PRINCIPAL DISCHARGE DIAGNOSIS  Diagnosis: Metabolic encephalopathy  Assessment and Plan of Treatment: You presented with worsening confusion likely due to worsening psychiatric condition. All infectious workup were negative. Cat scan of head was also negative for any acute findings. You were evaluated by psychiatrist, & your psychiatric medications were restarted.   Follow-up with your primary care physician.  Call your physician and or go to ER if you develop change in mental status, headache, focal weakness, difficulty speaking /swallowing, facial droop.        SECONDARY DISCHARGE DIAGNOSES  Diagnosis: Chronic schizoaffective disorder  Assessment and Plan of Treatment: You have a history of chronic schizoaffective disorder. Continue to take your medications as prescribed. Follow up with your PCP & Psychiatrist.  Pysch recommendations:  -Cont Invega 3 mg PO daily; can be optimized to 6 mg PO daily during the weekend  -Depakote 500 mg PO HS  -PRN: Zyprexa 2.5 mg PO/IM q 6 hrs PRN for moderate to severe agitation  -No criteria for an inpatient psychiatric admission;  -No psychiatric contraindications to discharge  -F/u with her outpatient psychiatrist.    Diagnosis: HTN (hypertension)  Assessment and Plan of Treatment: You have a history of high blood pressure on blood pressure medication for your b/p mangement. Continue to take your medication as prescribed.  Maintain a Low salt diet  Engage in activity as tolerated.  Take all medication as prescribed.  Follow up with your medical doctor for routine blood pressure monitoring at your next visit.  Notify your doctor if you have any of the following symptoms:   Dizziness, Lightheadedness, Blurry vision, Headache, Chest pain, Shortness of breath      Diagnosis: DM (diabetes mellitus)  Assessment and Plan of Treatment: You have a history of diabetes. Your recent A1C was 6.4.  Continue to adhere with your diabetic regimen  Follow up with your primary doctor    Diagnosis: HLD (hyperlipidemia)  Assessment and Plan of Treatment: Continue to take your Cholesterole medication as prescribed  Follow up with your primary care doctor.

## 2024-09-13 NOTE — PROGRESS NOTE ADULT - SUBJECTIVE AND OBJECTIVE BOX
NP Note discussed with  primary attending    Patient is a 58y old  Female who presents with a chief complaint of altered mental status (13 Sep 2024 09:58)      INTERVAL HPI/OVERNIGHT EVENTS: no new complaints    MEDICATIONS  (STANDING):  atorvastatin 40 milliGRAM(s) Oral at bedtime  divalproex  milliGRAM(s) Oral at bedtime  enoxaparin Injectable 40 milliGRAM(s) SubCutaneous every 24 hours  influenza   Vaccine 0.5 milliLiter(s) IntraMuscular once  insulin lispro (ADMELOG) corrective regimen sliding scale   SubCutaneous at bedtime  insulin lispro (ADMELOG) corrective regimen sliding scale   SubCutaneous three times a day before meals  lactated ringers. 1000 milliLiter(s) (80 mL/Hr) IV Continuous <Continuous>  losartan 50 milliGRAM(s) Oral daily  paliperidone ER. 3 milliGRAM(s) Oral daily    MEDICATIONS  (PRN):  OLANZapine 2.5 milliGRAM(s) Oral every 6 hours PRN moderate to severe agitation      __________________________________________________  REVIEW OF SYSTEMS:    CONSTITUTIONAL: No fever,   EYES: no acute visual disturbances  NECK: No pain or stiffness  RESPIRATORY: No cough; No shortness of breath  CARDIOVASCULAR: No chest pain, no palpitations  GASTROINTESTINAL: No pain. No nausea or vomiting; No diarrhea   NEUROLOGICAL: No headache or numbness, no tremors  MUSCULOSKELETAL: No joint pain, no muscle pain  GENITOURINARY: no dysuria, no frequency, no hesitancy  PSYCHIATRY: no depression , no anxiety  ALL OTHER  ROS negative        Vital Signs Last 24 Hrs  T(C): 37 (13 Sep 2024 05:05), Max: 37.1 (12 Sep 2024 20:06)  T(F): 98.6 (13 Sep 2024 05:05), Max: 98.8 (12 Sep 2024 20:06)  HR: 93 (13 Sep 2024 05:05) (79 - 93)  BP: 154/86 (13 Sep 2024 05:05) (144/78 - 154/86)  BP(mean): --  RR: 17 (13 Sep 2024 05:05) (17 - 18)  SpO2: 97% (13 Sep 2024 05:05) (95% - 97%)    Parameters below as of 13 Sep 2024 05:05  Patient On (Oxygen Delivery Method): room air        ________________________________________________  PHYSICAL EXAM:  GENERAL: NAD  HEENT: Normocephalic;  conjunctivae and sclerae clear; moist mucous membranes;   NECK : supple  CHEST/LUNG: Clear to ausculitation bilaterally with good air entry   HEART: S1 S2  regular; no murmurs, gallops or rubs  ABDOMEN: Soft, Nontender, Nondistended; Bowel sounds present  EXTREMITIES: no cyanosis; no edema; no calf tenderness  SKIN: warm and dry; no rash  NERVOUS SYSTEM:  A&Ox2    _________________________________________________  LABS:                        14.8   8.93  )-----------( 290      ( 13 Sep 2024 05:40 )             45.0     09-13    143  |  110<H>  |  18  ----------------------------<  111<H>  3.7   |  27  |  0.71    Ca    8.8      13 Sep 2024 05:40  Mg     1.9     09-11    TPro  6.5  /  Alb  3.0<L>  /  TBili  0.4  /  DBili  x   /  AST  11  /  ALT  23  /  AlkPhos  59  09-12    PT/INR - ( 11 Sep 2024 14:19 )   PT: 11.1 sec;   INR: 0.97 ratio         PTT - ( 11 Sep 2024 14:19 )  PTT:30.9 sec  Urinalysis Basic - ( 13 Sep 2024 05:40 )    Color: x / Appearance: x / SG: x / pH: x  Gluc: 111 mg/dL / Ketone: x  / Bili: x / Urobili: x   Blood: x / Protein: x / Nitrite: x   Leuk Esterase: x / RBC: x / WBC x   Sq Epi: x / Non Sq Epi: x / Bacteria: x      CAPILLARY BLOOD GLUCOSE      POCT Blood Glucose.: 132 mg/dL (13 Sep 2024 11:30)  POCT Blood Glucose.: 108 mg/dL (13 Sep 2024 07:50)  POCT Blood Glucose.: 128 mg/dL (12 Sep 2024 20:58)  POCT Blood Glucose.: 109 mg/dL (12 Sep 2024 16:26)        RADIOLOGY & ADDITIONAL TESTS:  < from: CT Head No Cont (09.11.24 @ 14:44) >  IMPRESSION:    1)  unremarkable CT study of the brain  2)  clear sinuses and mastoids..      < end of copied text >    Imaging Personally Reviewed:  YES    Consultant(s) Notes Reviewed:   YES    Care Discussed with Consultants :     Plan of care was discussed with patient and /or primary care giver; all questions and concerns were addressed and care was aligned with patient's wishes.

## 2024-09-13 NOTE — PROGRESS NOTE ADULT - PROBLEM SELECTOR PLAN 2
hx of schizoaffective disorder   On Paliperidone palmitate 156 Mg/ml at home  Psych consulted: smith Lowe appreciated    [place] recs:  -Start Paliperidone 3mg daily   -Depakote 500 mg PO HS  -PRN: Zyprexa 2.5 mg PO/IM q 6 hrs PRN for moderate to severe agitation  -No criteria for an inpatient psychiatric admission; hx of schizoaffective disorder   On Paliperidone palmitate 156 Mg/ml at home  Psych consulted: smith Lowe appreciated    [facility] recs:  -Cont Invega 3 mg PO daily; can be optimized to 6 mg PO daily during the weekend  -Depakote 500 mg PO HS  -PRN: Zyprexa 2.5 mg PO/IM q 6 hrs PRN for moderate to severe agitation  -No criteria for an inpatient psychiatric admission;  -No psychiatric contraindications to discharge  -F/u with her outpatient psychiatrist

## 2024-09-13 NOTE — DISCHARGE NOTE PROVIDER - ATTENDING DISCHARGE PHYSICAL EXAMINATION:
PHYSICAL EXAM:  GENERAL: NAD  HEAD:  Atraumatic, Normocephalic  EYES: conjunctiva and sclera clear  NECK: Supple, No JVD  CHEST/LUNG: Clear to auscultation bilaterally; No wheeze  HEART: Regular rate and rhythm; No murmurs, rubs, or gallops  ABDOMEN: Soft, Nontender, Nondistended; Bowel sounds present  EXTREMITIES:  No clubbing, cyanosis, or edema  NEUROLOGY: non-focal  SKIN: No rashes or lesions

## 2024-09-13 NOTE — BH CONSULTATION LIAISON PROGRESS NOTE - CURRENT MEDICATION
MEDICATIONS  (STANDING):  atorvastatin 40 milliGRAM(s) Oral at bedtime  divalproex  milliGRAM(s) Oral at bedtime  enoxaparin Injectable 40 milliGRAM(s) SubCutaneous every 24 hours  influenza   Vaccine 0.5 milliLiter(s) IntraMuscular once  insulin lispro (ADMELOG) corrective regimen sliding scale   SubCutaneous three times a day before meals  insulin lispro (ADMELOG) corrective regimen sliding scale   SubCutaneous at bedtime  lactated ringers. 1000 milliLiter(s) (80 mL/Hr) IV Continuous <Continuous>  losartan 50 milliGRAM(s) Oral daily  paliperidone ER. 3 milliGRAM(s) Oral daily    MEDICATIONS  (PRN):  OLANZapine 2.5 milliGRAM(s) Oral every 6 hours PRN moderate to severe agitation

## 2024-09-13 NOTE — BH CONSULTATION LIAISON PROGRESS NOTE - NSBHASSESSMENTFT_PSY_ALL_CORE
57 y/o F with a hx of schizophrenia, HTN, and DM2, who was admitted due to confusion. She is followed for her underlying schizophrenia. She presents with a mild to moderate decompensation of her underlying psychotic illness that should stabilize after several days of compliance with antipsychotics. At the moment she does not present an imminent threat to herself or to others, so an involuntary inpatient psychiatric admission is not indicated. She is not suicidal or homicidal.    -Cont Invega 3 mg PO daily; can be optimized to 6 mg PO daily during the weekend  -Cont Depakote 500 mg PO HS  -PRN: Zyprexa 2.5 mg PO/IM q 6 hrs PRN for moderate to severe agitation  -No criteria for an inpatient psychiatric admission; observation status as per primary team  -Monitor her QTc  -Should f/u with her outpatient psychiatrist  -SW f/u  -Case discussed with the primary team  -Will follow as needed

## 2024-09-13 NOTE — DISCHARGE NOTE PROVIDER - NSDCHC_MEDRECSTATUS_GEN_ALL_CORE
Admission Reconciliation is Completed  Discharge Reconciliation is Not Complete Admission Reconciliation is Completed  Discharge Reconciliation is Completed all other ROS negative except as per HPI

## 2024-09-13 NOTE — DISCHARGE NOTE PROVIDER - NSDCMRMEDTOKEN_GEN_ALL_CORE_FT
Lipitor 40 mg oral tablet: 1 tab(s) orally once a day  losartan 50 mg oral tablet: 1 tab(s) orally once a day  Ozempic 2 mg/1.5 mL (0.25 mg or 0.5 mg dose) subcutaneous solution: 0.5 milligram(s) subcutaneously once a week  semaglutide 2 mg/1.5 mL (0.25 mg or 0.5 mg dose) subcutaneous solution: 0.5 milligram(s) subcutaneously once a month   atorvastatin 40 mg oral tablet: 1 tab(s) orally once a day (at bedtime)  divalproex sodium 500 mg oral delayed release tablet: 1 tab(s) orally once a day (at bedtime)  insulin lispro 100 units/mL injectable solution: 1 unit(s) injectable 3 times a day (before meals) sliding scale per facility protocol  losartan 50 mg oral tablet: 1 tab(s) orally once a day  OLANZapine 2.5 mg oral tablet: 1 tab(s) orally every 6 hours As needed moderate to severe agitation  paliperidone 6 mg oral tablet, extended release: 1 tab(s) orally once a day once a day 12PM

## 2024-09-13 NOTE — BH CONSULTATION LIAISON PROGRESS NOTE - NSBHFUPINTERVALHXFT_PSY_A_CORE
Patient seen and chart reviewed. Patient found sitting in her chair. Appears more engaging than yesterday

## 2024-09-13 NOTE — DISCHARGE NOTE PROVIDER - HOSPITAL COURSE
58-year-old female, AAOx1, ambulates with assistance, with a past medical  history of diabetes, hypertension, bipolar, and schizophrenia presents to ED for confusion for the past 2 weeks.  vitals wnl. Labs wnl. CTH neg. All infectious workup negative.   Patient is being admitted for metabolic encephelopathy of unknown origin but likely from worsening of her psychiatric condition.   Psych Dr. Mcdermott consulted. Home psych meds resumed.   SW consulted: As family can no longer take care of the patient at home.    58-year-old female, AAOx1, ambulates with assistance, with a past medical  history of diabetes, hypertension, bipolar, and schizophrenia presents to ED for confusion for the past 2 weeks.  vitals wnl. Labs wnl. CTH neg. All infectious workup negative.   Patient is being admitted for metabolic encephelopathy of unknown origin but likely from worsening of her psychiatric condition.   Psych Dr. Mcdermott consulted. Home psych meds resumed.   SW consulted: As family can no longer take care of the patient at home.     Please note that this a brief summary of hospital course please refer to daily progress notes and consult notes for full course and events. Patient seen and examined at bedside, discussed with medical attending. Patient medically cleared for discharge to.    INCOMPLETE ????????????? 58-year-old female, AAOx1, ambulates with assistance, with a past medical  history of diabetes, hypertension, bipolar, and schizophrenia presents to ED for confusion for the past 2 weeks.  vitals wnl. Labs wnl. CTH neg. All infectious workup negative.   Patient is being admitted for metabolic encephelopathy of unknown origin but likely from worsening of her psychiatric condition.   Psych Dr. Mcdermott consulted. Home psych meds resumed.   SW consulted: As family can no longer take care of the patient at home.     Please note that this a brief summary of hospital course, please refer to daily progress notes and consult notes for full course and events. Patient seen and examined at bedside, discussed with medical attending. Patient medically cleared for discharge to.    Pysch recs:  -Cont Invega 3 mg PO daily; can be optimized to 6 mg PO daily during the weekend  -Depakote 500 mg PO HS  -PRN: Zyprexa 2.5 mg PO/IM q 6 hrs PRN for moderate to severe agitation  -No criteria for an inpatient psychiatric admission;  -No psychiatric contraindications to discharge  -F/u with her outpatient psychiatrist.    INCOMPLETE ????????????? 58-year-old female, AAOx1, ambulates with assistance, with a past medical  history of diabetes, hypertension, bipolar, and schizophrenia presents to ED for confusion for the past 2 weeks.  vitals wnl. Labs wnl. CTH neg. All infectious workup negative.   Patient is being admitted for metabolic encephelopathy of unknown origin but likely from worsening of her psychiatric condition.   Psych Dr. Mcdermott consulted. Home psych meds resumed.   SW consulted: As family can no longer take care of the patient at home.     Please note that this a brief summary of hospital course, please refer to daily progress notes and consult notes for full course and events. Patient seen and examined at bedside, discussed with medical attending. Patient medically cleared for discharge to.    Pysch recs:  -Cont Invega 3 mg PO daily; can be optimized to 6 mg PO daily during the weekend  -Depakote 500 mg PO HS  -PRN: Zyprexa 2.5 mg PO/IM q 6 hrs PRN for moderate to severe agitation  -No criteria for an inpatient psychiatric admission;  -No psychiatric contraindications to discharge  -F/u with her outpatient psychiatrist.      Medically optimized for discharge  Discharge discussed with attending note this is just a brief course for full course please refer to daily progress and consult notes.

## 2024-09-13 NOTE — BH CONSULTATION LIAISON PROGRESS NOTE - NSBHCHARTREVIEWVS_PSY_A_CORE FT
Vital Signs Last 24 Hrs  T(C): 36.9 (13 Sep 2024 13:47), Max: 37.1 (12 Sep 2024 20:06)  T(F): 98.4 (13 Sep 2024 13:47), Max: 98.8 (12 Sep 2024 20:06)  HR: 84 (13 Sep 2024 13:47) (84 - 93)  BP: 113/82 (13 Sep 2024 13:47) (113/82 - 154/86)  BP(mean): --  RR: 18 (13 Sep 2024 13:47) (17 - 18)  SpO2: 95% (13 Sep 2024 13:47) (95% - 97%)    Parameters below as of 13 Sep 2024 13:47  Patient On (Oxygen Delivery Method): room air

## 2024-09-13 NOTE — PROGRESS NOTE ADULT - NS ATTEND AMEND GEN_ALL_CORE FT
58F PMH bipolar and schizophrenia on monthly paliperidone injections at [facility] p/w 2 weeks of confusion/inability to perform ADLs. Pt reports several episodes of acute agitation and confusion, most recently requiring hospitalization at [facility]. Admission for encephalopathy.    AP  Encephalopathy   Suspect early dementia or decompensated schizophrenia  Schizophrenia  Bipolar  HTN  DM    -Patient’s episode sound like agitation possibly iso of decompensated schizophrenia. After most recent episode, daughter reports she has been laying in bed, soiling herself, and unable to perform normal ADLs. On exam, she is cooperative, AOx2, following commands and answering qs appropriately.  -discussed with psych Dr Mcdermott: appears to be decompensated schizoaffective disorder due to medication non-adherence - resume paliperidone (invega) at 3mg PO.   -resume home depakote 500mg at bedtime  -f/u neuro recs  -resume home meds  -SW, CM eval  -dvt ppx    -pending placement to NH  =================================  I independently reviewed the following tests: N/A  I discussed management with specialists and the plan of care is described above.  I ordered labs and studies: CBC, BMP  I reviewed the following labs to guide my management:                        14.8   8.93  )-----------( 290      ( 13 Sep 2024 05:40 )             45.0     09-13    143  |  110<H>  |  18  ----------------------------<  111<H>  3.7   |  27  |  0.71    Ca    8.8      13 Sep 2024 05:40    TPro  6.5  /  Alb  3.0<L>  /  TBili  0.4  /  DBili  x   /  AST  11  /  ALT  23  /  AlkPhos  59  09-12

## 2024-09-13 NOTE — BH CONSULTATION LIAISON PROGRESS NOTE - NSICDXBHSECONDARYDX_PSY_ALL_CORE
Metabolic encephalopathy   G93.41  Chronic schizoaffective disorder   F25.9  HTN (hypertension)   I10  HLD (hyperlipidemia)   E78.5  DM (diabetes mellitus)   E11.9  Prophylactic measure   Z29.9  Discharge planning issues   Z75.8

## 2024-09-14 LAB
ANION GAP SERPL CALC-SCNC: 6 MMOL/L — SIGNIFICANT CHANGE UP (ref 5–17)
BUN SERPL-MCNC: 21 MG/DL — HIGH (ref 7–18)
CALCIUM SERPL-MCNC: 9.1 MG/DL — SIGNIFICANT CHANGE UP (ref 8.4–10.5)
CHLORIDE SERPL-SCNC: 110 MMOL/L — HIGH (ref 96–108)
CO2 SERPL-SCNC: 26 MMOL/L — SIGNIFICANT CHANGE UP (ref 22–31)
CREAT SERPL-MCNC: 0.7 MG/DL — SIGNIFICANT CHANGE UP (ref 0.5–1.3)
EGFR: 100 ML/MIN/1.73M2 — SIGNIFICANT CHANGE UP
GLUCOSE BLDC GLUCOMTR-MCNC: 101 MG/DL — HIGH (ref 70–99)
GLUCOSE BLDC GLUCOMTR-MCNC: 103 MG/DL — HIGH (ref 70–99)
GLUCOSE BLDC GLUCOMTR-MCNC: 106 MG/DL — HIGH (ref 70–99)
GLUCOSE BLDC GLUCOMTR-MCNC: 132 MG/DL — HIGH (ref 70–99)
GLUCOSE SERPL-MCNC: 112 MG/DL — HIGH (ref 70–99)
HCT VFR BLD CALC: 45.1 % — HIGH (ref 34.5–45)
HGB BLD-MCNC: 14.5 G/DL — SIGNIFICANT CHANGE UP (ref 11.5–15.5)
MCHC RBC-ENTMCNC: 27.1 PG — SIGNIFICANT CHANGE UP (ref 27–34)
MCHC RBC-ENTMCNC: 32.2 GM/DL — SIGNIFICANT CHANGE UP (ref 32–36)
MCV RBC AUTO: 84.3 FL — SIGNIFICANT CHANGE UP (ref 80–100)
NRBC # BLD: 0 /100 WBCS — SIGNIFICANT CHANGE UP (ref 0–0)
PLATELET # BLD AUTO: 294 K/UL — SIGNIFICANT CHANGE UP (ref 150–400)
POTASSIUM SERPL-MCNC: 3.7 MMOL/L — SIGNIFICANT CHANGE UP (ref 3.5–5.3)
POTASSIUM SERPL-SCNC: 3.7 MMOL/L — SIGNIFICANT CHANGE UP (ref 3.5–5.3)
RBC # BLD: 5.35 M/UL — HIGH (ref 3.8–5.2)
RBC # FLD: 16.7 % — HIGH (ref 10.3–14.5)
SODIUM SERPL-SCNC: 142 MMOL/L — SIGNIFICANT CHANGE UP (ref 135–145)
WBC # BLD: 8.77 K/UL — SIGNIFICANT CHANGE UP (ref 3.8–10.5)
WBC # FLD AUTO: 8.77 K/UL — SIGNIFICANT CHANGE UP (ref 3.8–10.5)

## 2024-09-14 PROCEDURE — 99232 SBSQ HOSP IP/OBS MODERATE 35: CPT

## 2024-09-14 RX ADMIN — ENOXAPARIN SODIUM 40 MILLIGRAM(S): 100 INJECTION SUBCUTANEOUS at 18:18

## 2024-09-14 RX ADMIN — PALIPERIDONE 3 MILLIGRAM(S): 3 TABLET, EXTENDED RELEASE ORAL at 11:54

## 2024-09-14 RX ADMIN — Medication 40 MILLIGRAM(S): at 21:46

## 2024-09-14 RX ADMIN — DIVALPROEX SODIUM 500 MILLIGRAM(S): 125 CAPSULE, DELAYED RELEASE ORAL at 22:30

## 2024-09-14 RX ADMIN — LOSARTAN POTASSIUM 50 MILLIGRAM(S): 50 TABLET ORAL at 05:33

## 2024-09-14 NOTE — PROGRESS NOTE ADULT - SUBJECTIVE AND OBJECTIVE BOX
Interval of present illness: No acute events overnight. Pt seen at bedside. No new complaints.    REVIEW OF SYSTEMS:    CONSTITUTIONAL: No fever  EYES: No acute visual disturbances  NECK: No pain or stiffness  RESPIRATORY: No cough; No shortness of breath  CARDIOVASCULAR: No chest pain, no palpitations  GASTROINTESTINAL: No pain. No nausea or vomiting.  No diarrhea   NEUROLOGICAL: No headache or numbness, no tremors  MUSCULOSKELETAL: no muscle pain  GENITOURINARY: No dysuria, no frequency, no hesitancy  PSYCHIATRY: No depression , no anxiety  ALL OTHER  ROS negative     O:  Vital Signs Last 24 Hrs  T(C): 37.2 (14 Sep 2024 05:20), Max: 37.2 (14 Sep 2024 05:20)  T(F): 99 (14 Sep 2024 05:20), Max: 99 (14 Sep 2024 05:20)  HR: 84 (14 Sep 2024 05:20) (84 - 84)  BP: 130/76 (14 Sep 2024 05:20) (113/82 - 135/77)  BP(mean): 94 (14 Sep 2024 05:20) (94 - 96)  RR: 18 (14 Sep 2024 05:20) (18 - 18)  SpO2: 93% (14 Sep 2024 05:20) (93% - 95%)    Parameters below as of 14 Sep 2024 05:20  Patient On (Oxygen Delivery Method): room air        Gen: NAD  Neuro: alert, oriented to self and place, answering qs appropriately and follows commands  HEENT: anicteric, moist oral mucosa  Neck: supple  Cards: no murmurs appreciated  Pulm: good inspiratory effort, breathing comfortably  Abd: soft, NT/ND, BS+  Ext: no edema  Skin: warm, dry      atorvastatin 40 milliGRAM(s) Oral at bedtime  divalproex  milliGRAM(s) Oral at bedtime  enoxaparin Injectable 40 milliGRAM(s) SubCutaneous every 24 hours  influenza   Vaccine 0.5 milliLiter(s) IntraMuscular once  insulin lispro (ADMELOG) corrective regimen sliding scale   SubCutaneous three times a day before meals  insulin lispro (ADMELOG) corrective regimen sliding scale   SubCutaneous at bedtime  lactated ringers. 1000 milliLiter(s) IV Continuous <Continuous>  losartan 50 milliGRAM(s) Oral daily  OLANZapine 2.5 milliGRAM(s) Oral every 6 hours PRN  paliperidone ER. 3 milliGRAM(s) Oral daily                            14.5   8.77  )-----------( 294      ( 14 Sep 2024 05:30 )             45.1       09-14    142  |  110<H>  |  21<H>  ----------------------------<  112<H>  3.7   |  26  |  0.70    Ca    9.1      14 Sep 2024 05:30

## 2024-09-15 LAB
ANION GAP SERPL CALC-SCNC: 8 MMOL/L — SIGNIFICANT CHANGE UP (ref 5–17)
BUN SERPL-MCNC: 21 MG/DL — HIGH (ref 7–18)
CALCIUM SERPL-MCNC: 9.2 MG/DL — SIGNIFICANT CHANGE UP (ref 8.4–10.5)
CHLORIDE SERPL-SCNC: 108 MMOL/L — SIGNIFICANT CHANGE UP (ref 96–108)
CO2 SERPL-SCNC: 26 MMOL/L — SIGNIFICANT CHANGE UP (ref 22–31)
CREAT SERPL-MCNC: 0.65 MG/DL — SIGNIFICANT CHANGE UP (ref 0.5–1.3)
EGFR: 102 ML/MIN/1.73M2 — SIGNIFICANT CHANGE UP
GLUCOSE BLDC GLUCOMTR-MCNC: 103 MG/DL — HIGH (ref 70–99)
GLUCOSE BLDC GLUCOMTR-MCNC: 104 MG/DL — HIGH (ref 70–99)
GLUCOSE BLDC GLUCOMTR-MCNC: 125 MG/DL — HIGH (ref 70–99)
GLUCOSE BLDC GLUCOMTR-MCNC: 99 MG/DL — SIGNIFICANT CHANGE UP (ref 70–99)
GLUCOSE SERPL-MCNC: 106 MG/DL — HIGH (ref 70–99)
HCT VFR BLD CALC: 43.9 % — SIGNIFICANT CHANGE UP (ref 34.5–45)
HGB BLD-MCNC: 14.4 G/DL — SIGNIFICANT CHANGE UP (ref 11.5–15.5)
MAGNESIUM SERPL-MCNC: 2.2 MG/DL — SIGNIFICANT CHANGE UP (ref 1.6–2.6)
MCHC RBC-ENTMCNC: 27.7 PG — SIGNIFICANT CHANGE UP (ref 27–34)
MCHC RBC-ENTMCNC: 32.8 GM/DL — SIGNIFICANT CHANGE UP (ref 32–36)
MCV RBC AUTO: 84.4 FL — SIGNIFICANT CHANGE UP (ref 80–100)
NRBC # BLD: 0 /100 WBCS — SIGNIFICANT CHANGE UP (ref 0–0)
PHOSPHATE SERPL-MCNC: 3.7 MG/DL — SIGNIFICANT CHANGE UP (ref 2.5–4.5)
PLATELET # BLD AUTO: 272 K/UL — SIGNIFICANT CHANGE UP (ref 150–400)
POTASSIUM SERPL-MCNC: 4 MMOL/L — SIGNIFICANT CHANGE UP (ref 3.5–5.3)
POTASSIUM SERPL-SCNC: 4 MMOL/L — SIGNIFICANT CHANGE UP (ref 3.5–5.3)
RBC # BLD: 5.2 M/UL — SIGNIFICANT CHANGE UP (ref 3.8–5.2)
RBC # FLD: 16.9 % — HIGH (ref 10.3–14.5)
SODIUM SERPL-SCNC: 142 MMOL/L — SIGNIFICANT CHANGE UP (ref 135–145)
WBC # BLD: 8.18 K/UL — SIGNIFICANT CHANGE UP (ref 3.8–10.5)
WBC # FLD AUTO: 8.18 K/UL — SIGNIFICANT CHANGE UP (ref 3.8–10.5)

## 2024-09-15 PROCEDURE — 99232 SBSQ HOSP IP/OBS MODERATE 35: CPT

## 2024-09-15 RX ORDER — PALIPERIDONE 3 MG/1
6 TABLET, EXTENDED RELEASE ORAL
Refills: 0 | Status: DISCONTINUED | OUTPATIENT
Start: 2024-09-15 | End: 2024-09-20

## 2024-09-15 RX ADMIN — ENOXAPARIN SODIUM 40 MILLIGRAM(S): 100 INJECTION SUBCUTANEOUS at 17:18

## 2024-09-15 RX ADMIN — Medication 40 MILLIGRAM(S): at 21:20

## 2024-09-15 RX ADMIN — DIVALPROEX SODIUM 500 MILLIGRAM(S): 125 CAPSULE, DELAYED RELEASE ORAL at 21:20

## 2024-09-15 RX ADMIN — LOSARTAN POTASSIUM 50 MILLIGRAM(S): 50 TABLET ORAL at 05:38

## 2024-09-15 RX ADMIN — PALIPERIDONE 6 MILLIGRAM(S): 3 TABLET, EXTENDED RELEASE ORAL at 13:49

## 2024-09-15 NOTE — CHART NOTE - NSCHARTNOTEFT_GEN_A_CORE
Seen at bedside, interviewed via  # [id number].  Pt. with flat affect, confused to place and time.  When asked if theres anything I can help her with pt. responds "too many things" but unable to elaborate despite repeated attempts to get pt. to communicate concerns.  Pt. is quiet and calm with no behavioral disturbances. Seen at bedside, interviewed via  # [id number].  Pt. with flat affect, confused to place and time.  When asked if theres anything I can help her with pt. responds "too many things" but unable to elaborate despite repeated attempts to get pt. to communicate concerns.  Pt. is quiet and calm with no behavioral disturbances.  Pt.'s GD updated re pt.'s condition and plan of care.  Pt. noted somewhat more oriented with GD at bedside, remains calm.

## 2024-09-16 LAB
GLUCOSE BLDC GLUCOMTR-MCNC: 101 MG/DL — HIGH (ref 70–99)
GLUCOSE BLDC GLUCOMTR-MCNC: 113 MG/DL — HIGH (ref 70–99)
GLUCOSE BLDC GLUCOMTR-MCNC: 90 MG/DL — SIGNIFICANT CHANGE UP (ref 70–99)
GLUCOSE BLDC GLUCOMTR-MCNC: 95 MG/DL — SIGNIFICANT CHANGE UP (ref 70–99)
MAGNESIUM SERPL-MCNC: 2.1 MG/DL — SIGNIFICANT CHANGE UP (ref 1.6–2.6)
PHOSPHATE SERPL-MCNC: 3.6 MG/DL — SIGNIFICANT CHANGE UP (ref 2.5–4.5)

## 2024-09-16 PROCEDURE — 99232 SBSQ HOSP IP/OBS MODERATE 35: CPT

## 2024-09-16 RX ADMIN — ENOXAPARIN SODIUM 40 MILLIGRAM(S): 100 INJECTION SUBCUTANEOUS at 17:08

## 2024-09-16 RX ADMIN — DIVALPROEX SODIUM 500 MILLIGRAM(S): 125 CAPSULE, DELAYED RELEASE ORAL at 21:11

## 2024-09-16 RX ADMIN — Medication 40 MILLIGRAM(S): at 21:12

## 2024-09-16 RX ADMIN — PALIPERIDONE 6 MILLIGRAM(S): 3 TABLET, EXTENDED RELEASE ORAL at 11:09

## 2024-09-16 NOTE — PROGRESS NOTE ADULT - NS ATTEND AMEND GEN_ALL_CORE FT
58F PMH bipolar and schizophrenia on monthly paliperidone injections at [facility] p/w 2 weeks of confusion/inability to perform ADLs. Pt reports several episodes of acute agitation and confusion, most recently requiring hospitalization at [facility]. Admission for encephalopathy.    AP  Encephalopathy   Suspect early dementia or decompensated schizophrenia  Schizophrenia  Bipolar  HTN  DM    -Patient’s episode sound like agitation possibly iso of decompensated schizophrenia. After most recent episode, daughter reports she has been laying in bed, soiling herself, and unable to perform normal ADLs. On exam, she is cooperative, AOx2, following commands and answering qs appropriately.  -discussed with psych Dr Mcdermott: appears to be decompensated schizoaffective disorder due to medication non-adherence   -resumed paliperidone (invega) at 3mg PO and uptitrated to 6mg   -resumed home depakote 500mg at bedtime  -resume home meds  -ARYA NICOLE eval  -dvt ppx    -Dispo: Daughter said she could not care for pt in this state. SW does not think she will be candidate for nursing home. However, we think she will improve slowly now that her psych meds have been restarted. COREY will speak with daughter to see if is willing to take her mother home.    =================================  I independently reviewed the following tests: N/A  I discussed management with specialists and the plan of care is described above.  I ordered labs and studies: CBC, BMP  I reviewed the following labs to guide my management:                        14.4   8.18  )-----------( 272      ( 15 Sep 2024 05:20 )             43.9     09-15    142  |  108  |  21<H>  ----------------------------<  106<H>  4.0   |  26  |  0.65    Ca    9.2      15 Sep 2024 05:20  Phos  3.6     09-16  Mg     2.1     09-16 58F PMH bipolar and schizophrenia on monthly paliperidone injections at [facility] p/w 2 weeks of confusion/inability to perform ADLs. Pt reports several episodes of acute agitation and confusion, most recently requiring hospitalization at [facility]. Admission for patient's inability to care for herself, likely due to decompensated schizophrenis. Now, calm and cooperative    AP  Encephalopathy   Suspect early dementia or decompensated schizophrenia  Schizophrenia  Bipolar  HTN  DM    -Patient’s episode sound like agitation possibly iso of decompensated schizophrenia. After most recent episode, daughter reports she has been laying in bed, soiling herself, and unable to perform normal ADLs. On exam, she is cooperative, AOx2, following commands and answering qs appropriately.  -discussed with psych Dr Mcdermott: appears to be decompensated schizoaffective disorder due to medication non-adherence   -resumed paliperidone (invega) at 3mg PO and uptitrated to 6mg   -resumed home depakote 500mg at bedtime  -resume home meds  -SW, CM eval  -dvt ppx    -Dispo: Daughter said she could not care for pt in this state. SW does not think she will be candidate for nursing home. However, we think she will improve slowly now that her psych meds have been restarted. SW will speak with daughter to see if is willing to take her mother home.    =================================  I independently reviewed the following tests: N/A  I discussed management with specialists and the plan of care is described above.  I ordered labs and studies: CBC, BMP  I reviewed the following labs to guide my management:                        14.4   8.18  )-----------( 272      ( 15 Sep 2024 05:20 )             43.9     09-15    142  |  108  |  21<H>  ----------------------------<  106<H>  4.0   |  26  |  0.65    Ca    9.2      15 Sep 2024 05:20  Phos  3.6     09-16  Mg     2.1     09-16

## 2024-09-16 NOTE — PROGRESS NOTE ADULT - TIME BILLING
-Review of hospital course, labs, vitals, medical records  -Bedside exam and interview  -Discussed plan and coordinated care with ACP/housestaff, family, specialists, /  -Documenting the encounter.

## 2024-09-16 NOTE — PROGRESS NOTE ADULT - SUBJECTIVE AND OBJECTIVE BOX
NP Note discussed with  Primary Attending    Patient is a 58y old  Female who presents with a chief complaint of altered mental status (15 Sep 2024 11:39).  Sitting in chair, comfortable, quiet with persisting flat affect.   # [id number] utilized.  Pt. speaks in Maldivian stating "I dont speak English or Maldivian"  Further interview limited as pt. does not answer many open ended questions, states she wants to go home.      INTERVAL HPI/OVERNIGHT EVENTS: no new complaints    MEDICATIONS  (STANDING):  atorvastatin 40 milliGRAM(s) Oral at bedtime  divalproex  milliGRAM(s) Oral at bedtime  enoxaparin Injectable 40 milliGRAM(s) SubCutaneous every 24 hours  influenza   Vaccine 0.5 milliLiter(s) IntraMuscular once  insulin lispro (ADMELOG) corrective regimen sliding scale   SubCutaneous at bedtime  insulin lispro (ADMELOG) corrective regimen sliding scale   SubCutaneous three times a day before meals  lactated ringers. 1000 milliLiter(s) (80 mL/Hr) IV Continuous <Continuous>  losartan 50 milliGRAM(s) Oral daily  paliperidone ER. 6 milliGRAM(s) Oral <User Schedule>    MEDICATIONS  (PRN):  OLANZapine 2.5 milliGRAM(s) Oral every 6 hours PRN moderate to severe agitation      __________________________________________________  REVIEW OF SYSTEMS:  Comfortable    CONSTITUTIONAL: No fever,   EYES: no acute visual disturbances  NECK: No pain or stiffness  RESPIRATORY: No cough; No shortness of breath  CARDIOVASCULAR: No chest pain, no palpitations  GASTROINTESTINAL: No pain. No nausea or vomiting; No diarrhea   NEUROLOGICAL: No headache or numbness, no tremors  MUSCULOSKELETAL: No joint pain, no muscle pain  GENITOURINARY: no dysuria, no frequency, no hesitancy  PSYCHIATRY: no depression , no anxiety  ALL OTHER  ROS negative        Vital Signs Last 24 Hrs  T(C): 37 (16 Sep 2024 13:02), Max: 37 (16 Sep 2024 13:02)  T(F): 98.6 (16 Sep 2024 13:02), Max: 98.6 (16 Sep 2024 13:02)  HR: 83 (16 Sep 2024 13:02) (74 - 83)  BP: 132/79 (16 Sep 2024 13:02) (104/54 - 132/79)  BP(mean): --  RR: 18 (16 Sep 2024 13:02) (18 - 18)  SpO2: 96% (16 Sep 2024 13:02) (94% - 96%)    Parameters below as of 16 Sep 2024 13:02  Patient On (Oxygen Delivery Method): room air        ________________________________________________  PHYSICAL EXAM:  Well developed, quiet, flat affect  GENERAL: NAD  HEENT: Normocephalic;  conjunctivae and sclerae clear; moist mucous membranes;   NECK : supple  CHEST/LUNG: Clear to auscultation bilaterally with good air entry   HEART: S1 S2  regular; no murmurs, gallops or rubs  ABDOMEN: Soft, Nontender, Nondistended; Bowel sounds present  EXTREMITIES: no cyanosis; no edema; no calf tenderness  SKIN: warm and dry; no rash  NERVOUS SYSTEM:  Awake and alert; Oriented  to place, person and time ; no new deficits    _________________________________________________  LABS:                        14.4   8.18  )-----------( 272      ( 15 Sep 2024 05:20 )             43.9     09-15    142  |  108  |  21<H>  ----------------------------<  106<H>  4.0   |  26  |  0.65    Ca    9.2      15 Sep 2024 05:20  Phos  3.6     09-16  Mg     2.1     09-16        Urinalysis Basic - ( 15 Sep 2024 05:20 )    Color: x / Appearance: x / SG: x / pH: x  Gluc: 106 mg/dL / Ketone: x  / Bili: x / Urobili: x   Blood: x / Protein: x / Nitrite: x   Leuk Esterase: x / RBC: x / WBC x   Sq Epi: x / Non Sq Epi: x / Bacteria: x      CAPILLARY BLOOD GLUCOSE      POCT Blood Glucose.: 113 mg/dL (16 Sep 2024 11:35)  POCT Blood Glucose.: 101 mg/dL (16 Sep 2024 07:41)  POCT Blood Glucose.: 104 mg/dL (15 Sep 2024 21:20)  POCT Blood Glucose.: 99 mg/dL (15 Sep 2024 16:36)    RADIOLOGY & ADDITIONAL TESTS:    < from: CT Head No Cont (09.11.24 @ 14:44) >    ACC: [id number] EXAM:  CT BRAIN   ORDERED BY: SLICK CEBALLOS     PROCEDURE DATE:  09/11/2024          INTERPRETATION:  INDICATION:  Altered mental status  TECHNIQUE:  A non contrast thin section axial CT study of the brain was   performed from skull base to vertex. Coronal and sagittal reformations   were generated from the axial data.  COMPARISON EXAMINATION:  No prior    FINDINGS:    HEMISPHERES:  No mass or space occupying lesion.  No acute ischemic   changes or hemorrhagic foci are suggested.  VENTRICLES:  Midline and normal in size.  POSTERIOR FOSSA:  The brain stem and cerebellum are unremarkable.  No CP   angle lesion noted.  EXTRACEREBRAL SPACES:  No subdural or epidural collections are noted.  SKULL BASE AND CALVARIUM:  Appears intact.  No fracture or destructive   lesion is identified.  SINUSES AND MASTOIDS:  Clear.  MISCELLANEOUS:  No orbital or suprasellar abnormality noted.    IMPRESSION:    1)  unremarkable CT study of the brain  2)  clear sinuses and mastoids..    --- Endof Report ---    < end of copied text >    Imaging Personally Reviewed:  YES/NO    Consultant(s) Notes Reviewed:   YES/ No    Care Discussed with Consultants :     Plan of care was discussed with patient and /or primary care giver; all questions and concerns were addressed and care was aligned with patient's wishes.

## 2024-09-16 NOTE — PROGRESS NOTE ADULT - PROBLEM SELECTOR PLAN 2
hx of schizoaffective disorder   On Paliperidone palmitate 156 Mg/ml at home  Psych consulted: smith Lowe appreciated    [facility] recs:  -Cont Invega 3 mg PO daily; can be optimized to 6 mg PO daily during the weekend  -Depakote 500 mg PO HS  -PRN: Zyprexa 2.5 mg PO/IM q 6 hrs PRN for moderate to severe agitation  -No criteria for an inpatient psychiatric admission;  -No psychiatric contraindications to discharge  -F/u with her outpatient psychiatrist

## 2024-09-17 LAB
GLUCOSE BLDC GLUCOMTR-MCNC: 103 MG/DL — HIGH (ref 70–99)
GLUCOSE BLDC GLUCOMTR-MCNC: 108 MG/DL — HIGH (ref 70–99)
GLUCOSE BLDC GLUCOMTR-MCNC: 109 MG/DL — HIGH (ref 70–99)
GLUCOSE BLDC GLUCOMTR-MCNC: 110 MG/DL — HIGH (ref 70–99)

## 2024-09-17 PROCEDURE — 99233 SBSQ HOSP IP/OBS HIGH 50: CPT

## 2024-09-17 RX ADMIN — DIVALPROEX SODIUM 500 MILLIGRAM(S): 125 CAPSULE, DELAYED RELEASE ORAL at 21:22

## 2024-09-17 RX ADMIN — LOSARTAN POTASSIUM 50 MILLIGRAM(S): 50 TABLET ORAL at 05:14

## 2024-09-17 RX ADMIN — Medication 40 MILLIGRAM(S): at 21:21

## 2024-09-17 RX ADMIN — PALIPERIDONE 6 MILLIGRAM(S): 3 TABLET, EXTENDED RELEASE ORAL at 15:38

## 2024-09-17 RX ADMIN — ENOXAPARIN SODIUM 40 MILLIGRAM(S): 100 INJECTION SUBCUTANEOUS at 17:10

## 2024-09-17 NOTE — PROGRESS NOTE ADULT - SUBJECTIVE AND OBJECTIVE BOX
Patient is a 58y old  Female who presents with a chief complaint of Altered mental status     (17 Sep 2024 10:37)      INTERVAL HPI/OVERNIGHT EVENTS: no new complaints    MEDICATIONS  (STANDING):  atorvastatin 40 milliGRAM(s) Oral at bedtime  divalproex  milliGRAM(s) Oral at bedtime  enoxaparin Injectable 40 milliGRAM(s) SubCutaneous every 24 hours  influenza   Vaccine 0.5 milliLiter(s) IntraMuscular once  insulin lispro (ADMELOG) corrective regimen sliding scale   SubCutaneous at bedtime  insulin lispro (ADMELOG) corrective regimen sliding scale   SubCutaneous three times a day before meals  lactated ringers. 1000 milliLiter(s) (80 mL/Hr) IV Continuous <Continuous>  losartan 50 milliGRAM(s) Oral daily  paliperidone ER. 6 milliGRAM(s) Oral <User Schedule>    MEDICATIONS  (PRN):  OLANZapine 2.5 milliGRAM(s) Oral every 6 hours PRN moderate to severe agitation      __________________________________________________  REVIEW OF SYSTEMS:    CONSTITUTIONAL: No fever,   EYES: no acute visual disturbances  NECK: No pain or stiffness  RESPIRATORY: No cough; No shortness of breath  CARDIOVASCULAR: No chest pain, no palpitations  GASTROINTESTINAL: No pain. No nausea or vomiting; No diarrhea   NEUROLOGICAL: No headache or numbness, no tremors  MUSCULOSKELETAL: No joint pain, no muscle pain  GENITOURINARY: no dysuria, no frequency, no hesitancy  PSYCHIATRY: no depression , no anxiety  ALL OTHER  ROS negative      Vital Signs Last 24 Hrs  T(C): 37 (17 Sep 2024 05:38), Max: 37 (16 Sep 2024 13:02)  T(F): 98.6 (17 Sep 2024 05:38), Max: 98.6 (16 Sep 2024 13:02)  HR: 89 (17 Sep 2024 05:38) (75 - 89)  BP: 123/90 (17 Sep 2024 05:38) (123/90 - 132/79)  BP(mean): 87 (17 Sep 2024 05:38) (87 - 87)  RR: 18 (17 Sep 2024 05:38) (18 - 18)  SpO2: 95% (17 Sep 2024 05:38) (95% - 96%)    Parameters below as of 17 Sep 2024 05:38  Patient On (Oxygen Delivery Method): room air        ________________________________________________  PHYSICAL EXAM:  GENERAL: NAD  HEENT: Normocephalic;  conjunctivae and sclerae clear; moist mucous membranes;   NECK : supple  CHEST/LUNG: Clear to auscultation bilaterally with good air entry   HEART: S1 S2  regular; no murmurs, gallops or rubs  ABDOMEN: Soft, Nontender, Nondistended; Bowel sounds present  EXTREMITIES: no cyanosis; no edema; no calf tenderness  SKIN: warm and dry; no rash  NERVOUS SYSTEM:  Awake and alert; Oriented  to place, person and time ; no new deficits    _________________________________________________  LABS:      Phos  3.6     09-16  Mg     2.1     09-16          CAPILLARY BLOOD GLUCOSE      POCT Blood Glucose.: 103 mg/dL (17 Sep 2024 07:45)  POCT Blood Glucose.: 95 mg/dL (16 Sep 2024 20:59)  POCT Blood Glucose.: 90 mg/dL (16 Sep 2024 16:27)  POCT Blood Glucose.: 113 mg/dL (16 Sep 2024 11:35)      RADIOLOGY & ADDITIONAL TESTS:    Imaging Personally Reviewed:  YES/NO    Consultant(s) Notes Reviewed:   YES/ No    Care Discussed with Consultants :     Plan of care was discussed with patient and /or primary care giver; all questions and concerns were addressed and care was aligned with patient's wishes.       Patient is a 58y old  Female who presents with a chief complaint of Altered mental status     (17 Sep 2024 10:37)      INTERVAL HPI/OVERNIGHT EVENTS: pt seen at bedside, pt sitting quietly but not responding to questions even when in Norwegian. Pt follows commands.    MEDICATIONS  (STANDING):  atorvastatin 40 milliGRAM(s) Oral at bedtime  divalproex  milliGRAM(s) Oral at bedtime  enoxaparin Injectable 40 milliGRAM(s) SubCutaneous every 24 hours  influenza   Vaccine 0.5 milliLiter(s) IntraMuscular once  insulin lispro (ADMELOG) corrective regimen sliding scale   SubCutaneous at bedtime  insulin lispro (ADMELOG) corrective regimen sliding scale   SubCutaneous three times a day before meals  lactated ringers. 1000 milliLiter(s) (80 mL/Hr) IV Continuous <Continuous>  losartan 50 milliGRAM(s) Oral daily  paliperidone ER. 6 milliGRAM(s) Oral <User Schedule>    MEDICATIONS  (PRN):  OLANZapine 2.5 milliGRAM(s) Oral every 6 hours PRN moderate to severe agitation  __________________________________________________  REVIEW OF SYSTEMS:    CONSTITUTIONAL: No fever,   EYES: no acute visual disturbances  NECK: No pain or stiffness  RESPIRATORY: No cough; No shortness of breath  CARDIOVASCULAR: No chest pain, no palpitations  GASTROINTESTINAL: No pain. No nausea or vomiting; No diarrhea   NEUROLOGICAL: No headache or numbness, no tremors  MUSCULOSKELETAL: No joint pain, no muscle pain  GENITOURINARY: no dysuria, no frequency, no hesitancy  PSYCHIATRY: no depression , no anxiety  ALL OTHER  ROS negative      Vital Signs Last 24 Hrs  T(C): 37 (17 Sep 2024 05:38), Max: 37 (16 Sep 2024 13:02)  T(F): 98.6 (17 Sep 2024 05:38), Max: 98.6 (16 Sep 2024 13:02)  HR: 89 (17 Sep 2024 05:38) (75 - 89)  BP: 123/90 (17 Sep 2024 05:38) (123/90 - 132/79)  BP(mean): 87 (17 Sep 2024 05:38) (87 - 87)  RR: 18 (17 Sep 2024 05:38) (18 - 18)  SpO2: 95% (17 Sep 2024 05:38) (95% - 96%)    Parameters below as of 17 Sep 2024 05:38  Patient On (Oxygen Delivery Method): room air    ________________________________________________  PHYSICAL EXAM:  GENERAL: NAD  HEENT: Normocephalic;  conjunctivae and sclerae clear; moist mucous membranes;   NECK : supple  CHEST/LUNG: Clear to auscultation bilaterally with good air entry   HEART: S1 S2  regular; no murmurs, gallops or rubs  ABDOMEN: Soft, Nontender, Nondistended; Bowel sounds present  EXTREMITIES: no cyanosis; no edema; no calf tenderness  SKIN: warm and dry; no rash  NERVOUS SYSTEM:  Awake and alert; Oriented  to place, person and time ; no new deficits    ____________________________________________  LABS:      Phos  3.6     09-16  Mg     2.1     09-16    CAPILLARY BLOOD GLUCOSE    POCT Blood Glucose.: 103 mg/dL (17 Sep 2024 07:45)  POCT Blood Glucose.: 95 mg/dL (16 Sep 2024 20:59)  POCT Blood Glucose.: 90 mg/dL (16 Sep 2024 16:27)  POCT Blood Glucose.: 113 mg/dL (16 Sep 2024 11:35)      RADIOLOGY & ADDITIONAL TESTS:  < from: CT Head No Cont (09.11.24 @ 14:44) >  ACC: [id number] EXAM:  CT BRAIN   ORDERED BY: SLICK CEBALLOS     PROCEDURE DATE:  09/11/2024          INTERPRETATION:  INDICATION:  Altered mental status  TECHNIQUE:  A non contrast thin section axial CT study of the brain was   performed from skull base to vertex. Coronal and sagittal reformations   were generated from the axial data.  COMPARISON EXAMINATION:  No prior    FINDINGS:    HEMISPHERES:  No mass or space occupying lesion.  No acute ischemic   changes or hemorrhagic foci are suggested.  VENTRICLES:  Midline and normal in size.  POSTERIOR FOSSA:  The brain stem and cerebellum are unremarkable.  No CP   angle lesion noted.  EXTRACEREBRAL SPACES:  No subdural or epidural collections are noted.  SKULL BASE AND CALVARIUM:  Appears intact.  No fracture or destructive   lesion is identified.  SINUSES AND MASTOIDS:  Clear.  MISCELLANEOUS:  No orbital or suprasellar abnormality noted.    IMPRESSION:    1)  unremarkable CT study of the brain  2)  clear sinuses and mastoids..    --- Endof Report ---    Imaging Personally Reviewed:  YES    Consultant(s) Notes Reviewed:   YES      Plan of care was discussed with patient and /or primary care giver; all questions and concerns were addressed and care was aligned with patient's wishes.

## 2024-09-17 NOTE — DIETITIAN INITIAL EVALUATION ADULT - NS FNS ENTERAL CURRENT ORDER
Subjective   Veronica Welsh is a 25 y.o. female.     Chief Complaint   Patient presents with   • Urinary Frequency     dysuria       Urinary Frequency   This is a new problem. The current episode started in the past 7 days. The problem has been gradually improving. The patient is experiencing no pain. There has been no fever. She is sexually active. Associated symptoms include frequency. Pertinent negatives include no discharge, flank pain, hematuria, nausea or vomiting. Treatments tried: Azo.  The treatment provided moderate relief.      The patient is being seen for a health maintenance evaluation.  The last health maintenance visit is unknown.  Social history: Household members include none.  She is unmarried.  Work status: Full-time.  The patient has never smoked cigarettes.  She reports occasional alcohol use.  She has never used illicit drugs.  General health: The patient's health since the last visit is described as good.  She does not have regular dental visits.  The patient brushes 2 times a day, does not floss and reports her last dental visit is unknown.  She denies vision problems.  Vision care includes no need for vision correction and no recent eye examination.  She denies hearing loss.  Immunization status: Pneumococcal and Tdap vaccinations needed.  Lifestyle: She does not exercise regularly.  Reproductive health: She is sexually active.  Screening: A normal Pap was performed on 7/6/2020.    The following portions of the patient's history were reviewed and updated as appropriate: allergies, current medications, past family history, past medical history, past social history, past surgical history and problem list.    Past Medical History:   Diagnosis Date   • Acne        Past Surgical History:   Procedure Laterality Date   • TONSILLECTOMY         Family History   Problem Relation Age of Onset   • No Known Problems Father    • Hyperlipidemia Mother    • Hyperlipidemia Maternal Grandfather        Social  "History     Socioeconomic History   • Marital status: Single     Spouse name: Not on file   • Number of children: Not on file   • Years of education: Not on file   • Highest education level: Not on file   Tobacco Use   • Smoking status: Never Smoker   • Smokeless tobacco: Never Used   Substance and Sexual Activity   • Alcohol use: No   • Drug use: No   • Sexual activity: Yes     Birth control/protection: OCP       Review of Systems   Constitutional: Negative for fever.   HENT: Negative for ear pain, rhinorrhea and sore throat.    Eyes: Negative for visual disturbance.   Respiratory: Negative for cough and shortness of breath.    Cardiovascular: Negative for chest pain.   Gastrointestinal: Negative for nausea and vomiting.   Genitourinary: Positive for frequency. Negative for flank pain and hematuria.   Musculoskeletal: Negative.    Skin: Negative for rash.   Neurological: Negative for dizziness and headache.   Psychiatric/Behavioral: Negative for depressed mood.       Objective   Vitals:    08/26/21 1059   BP: 118/82   BP Location: Left arm   Patient Position: Sitting   Cuff Size: Adult   Pulse: 88   Temp: 97.3 °F (36.3 °C)   TempSrc: Temporal   SpO2: 98%   Weight: 46.8 kg (103 lb 3.2 oz)   Height: 160 cm (62.99\")      Body mass index is 18.29 kg/m².  Physical Exam  Vitals and nursing note reviewed.   Constitutional:       Appearance: Normal appearance.   HENT:      Head: Normocephalic and atraumatic.      Right Ear: Tympanic membrane and ear canal normal.      Left Ear: Tympanic membrane and ear canal normal.   Eyes:      Conjunctiva/sclera: Conjunctivae normal.      Pupils: Pupils are equal, round, and reactive to light.   Cardiovascular:      Rate and Rhythm: Normal rate and regular rhythm.      Heart sounds: Normal heart sounds.   Pulmonary:      Effort: Pulmonary effort is normal.      Breath sounds: Normal breath sounds.   Abdominal:      General: Bowel sounds are normal.      Palpations: Abdomen is soft.      " Tenderness: There is no abdominal tenderness. There is no right CVA tenderness or left CVA tenderness.   Genitourinary:     Comments: Deferred   Musculoskeletal:         General: Normal range of motion.      Cervical back: Neck supple.   Skin:     General: Skin is warm and dry.   Neurological:      Mental Status: She is alert and oriented to person, place, and time.   Psychiatric:         Mood and Affect: Mood normal.           Assessment/Plan   Diagnoses and all orders for this visit:    1. Well female exam without gynecological exam (Primary)    2. Frequency of urination  -     POCT urinalysis dipstick, automated  -     POC Pregnancy, Urine    Impression: Currently, she has an inadequate exercise regimen.  Cervical cancer screening is current.  No screening lab work is due at this time.  Patient declines vaccinations at this time.  She was advised to be evaluated by dentist.  Advice and education were given regarding aerobic exercise.            Current diet order meets estimated nutrient requirements

## 2024-09-17 NOTE — DIETITIAN INITIAL EVALUATION ADULT - NS FNS DIET ORDER
Diet, Regular:   Consistent Carbohydrate {No Snacks}  DASH/TLC {Sodium & Cholesterol Restricted} (09-11-24 @ 15:48) [Active]

## 2024-09-17 NOTE — PROGRESS NOTE ADULT - PROBLEM SELECTOR PLAN 2
hx of schizoaffective disorder, on Paliperidone palmitate 156 Mg/ml at home  -Psych consulted: smith Lowe appreciated  -c/w-Cont Invega 6 mg PO daily, Depakote 500 mg PO HS  -PRN: Zyprexa 2.5 mg PO/IM q 6 hrs PRN for moderate to severe agitation  -No criteria for an inpatient psychiatric admission  -No psychiatric contraindications to discharge  -F/u with her outpatient psychiatrist

## 2024-09-17 NOTE — DIETITIAN INITIAL EVALUATION ADULT - PERTINENT LABORATORY DATA
Phos  3.6     09-16  Mg     2.1     09-16    POCT Blood Glucose.: 103 mg/dL (09-17-24 @ 07:45)  A1C with Estimated Average Glucose Result: 6.4 % (09-12-24 @ 05:15)

## 2024-09-17 NOTE — DIETITIAN INITIAL EVALUATION ADULT - PROBLEM SELECTOR PLAN 1
p/w worsening confusion for the past two weeks.  Vitals: temp 98.4, HR 83, /77   CTH neg   EKG: NSR   f/u UA  c/w iv fluids as per poor oral intake  likely worsening progression of schizoaffective disorder but rule out urinary retention vs infection.   Neurology consulted: Paula   Psych consulted: Mynor NICOLE consulted: As family can no longer take care of the patient   PT consulted: As family reports she is more unbalanced.

## 2024-09-17 NOTE — DIETITIAN INITIAL EVALUATION ADULT - PERTINENT MEDS FT
MEDICATIONS  (STANDING):  atorvastatin 40 milliGRAM(s) Oral at bedtime  divalproex  milliGRAM(s) Oral at bedtime  enoxaparin Injectable 40 milliGRAM(s) SubCutaneous every 24 hours  influenza   Vaccine 0.5 milliLiter(s) IntraMuscular once  insulin lispro (ADMELOG) corrective regimen sliding scale   SubCutaneous at bedtime  insulin lispro (ADMELOG) corrective regimen sliding scale   SubCutaneous three times a day before meals  lactated ringers. 1000 milliLiter(s) (80 mL/Hr) IV Continuous <Continuous>  losartan 50 milliGRAM(s) Oral daily  paliperidone ER. 6 milliGRAM(s) Oral <User Schedule>    MEDICATIONS  (PRN):  OLANZapine 2.5 milliGRAM(s) Oral every 6 hours PRN moderate to severe agitation

## 2024-09-17 NOTE — PROGRESS NOTE ADULT - NS ATTEND AMEND GEN_ALL_CORE FT
Patient seen at bedside, states she feels good, no acute symptoms noted.  On exam, patient is AOx2, NAD, cardiopulmonary exams unremarkable, abdomen soft, NT/ND, extremities without edema.  No labs available for review today.     Assessment and plan:  58-year-old female, AAOx1-2, ambulates with assistance, with a past medical history of diabetes, hypertension, bipolar, and schizophrenia presents to ED for confusion for the past 2 weeks.    # Cognitive dysfunction, likely 2/2 decompensated schizophrenia  # Bipolar disorder  # HTN  # DM    - psych Dr Mcdermott evaluation appreciated, the etiology of her cognitive dysfunction appears to be decompensated schizoaffective disorder due to medication non-adherence   -resumed paliperidone (invega) at 3mg PO and uptitrated to 6mg   -resumed home depakote 500mg at bedtime  -resume home meds  -ARYA NICOLE on board for safe discharge plan. patient is now medically ready, however she is not deemed to be safe alone at home. Also does not qualify for skilled nursing home given intact physical function. Discussing with children  -dvt ppx

## 2024-09-17 NOTE — CHART NOTE - NSCHARTNOTEFT_GEN_A_CORE
Spoke to pt Son in law Jalen Guardado at [phone]. Explained update on pt condition and need for ongoing follow up with outpt psych. Answered questions regarding recommendations from inpatient psych consult. further explained that pt is medically cleared for discharge and reiterated the options as explained by social work. Son understanding of plan. He stated he would like to apply for medicare and will bring requested information to facilitate that application. He would like for patient to be placed at a facility where she can be supervised around the clock as he does not have much family outside of his wife and they both work. He did however state that should there be no option for a facility he and his wife would like the patient discharged home and they would adjust in order to supervise until Medicaid is granted. Conversation discussed with team during wrap up IDR.

## 2024-09-17 NOTE — DIETITIAN INITIAL EVALUATION ADULT - ORAL INTAKE PTA/DIET HISTORY
Visited pt at bedside, unable to communicate with pt due to pt being AAOx1 with an admitting diagnosis of AMS. Will attain nutrition information through comprehensive chart review / RN. No new food allergies or intolerances noted per chart. Pt's intake PTA was noted to be poor in chart PTA. Unknown UBW, no HIE data.   Nutrition interview: No recent episodes of nausea, vomiting, diarrhea or constipation per RN/Chart. Last BM noted on 9/16 per RN flowsheets. Intake is % per RN flowsheets/RN. Food preferences explored and forwarded to dietary. Pt noted with POCT 90 - 152 between 9/11 - 9/17. HbA1c - 6.4% on 9/12.

## 2024-09-18 LAB
GLUCOSE BLDC GLUCOMTR-MCNC: 107 MG/DL — HIGH (ref 70–99)
GLUCOSE BLDC GLUCOMTR-MCNC: 117 MG/DL — HIGH (ref 70–99)
GLUCOSE BLDC GLUCOMTR-MCNC: 150 MG/DL — HIGH (ref 70–99)
GLUCOSE BLDC GLUCOMTR-MCNC: 95 MG/DL — SIGNIFICANT CHANGE UP (ref 70–99)

## 2024-09-18 PROCEDURE — 99232 SBSQ HOSP IP/OBS MODERATE 35: CPT

## 2024-09-18 RX ADMIN — Medication 40 MILLIGRAM(S): at 21:47

## 2024-09-18 RX ADMIN — ENOXAPARIN SODIUM 40 MILLIGRAM(S): 100 INJECTION SUBCUTANEOUS at 17:52

## 2024-09-18 RX ADMIN — PALIPERIDONE 6 MILLIGRAM(S): 3 TABLET, EXTENDED RELEASE ORAL at 14:04

## 2024-09-18 RX ADMIN — LOSARTAN POTASSIUM 50 MILLIGRAM(S): 50 TABLET ORAL at 05:07

## 2024-09-18 RX ADMIN — DIVALPROEX SODIUM 500 MILLIGRAM(S): 125 CAPSULE, DELAYED RELEASE ORAL at 21:47

## 2024-09-18 NOTE — PHYSICAL THERAPY INITIAL EVALUATION ADULT - SHORT TERM MEMORY, REHAB EVAL
Chief complaint:   Chief Complaint   Patient presents with   • Abscess     pt was here two days ago seen by Dr. Figueroa, abscess is on pts lower left abdominal area right above the groin, pt states that he was told to come back in for a f/u to see how things are going and if it needed to be repacked, pt states things going ok, still pinches if he moves to fast        Vitals:  Visit Vitals  /77 (BP Location: LUE - Left upper extremity, Patient Position: Sitting, Cuff Size: Large Adult)   Pulse 84   Temp 98.3 °F (36.8 °C) (Temporal)   Ht 5' 11\" (1.803 m)   Wt 108.9 kg   SpO2 97%   BMI 33.47 kg/m²       HISTORY OF PRESENT ILLNESS     38-year-old male who presents to walk-in clinic today for wound evaluation.  Patient was here on Sunday and had an abscess incised and drained by Dr. Figueroa.  Patient presents today to have the wound re-evaluated and the dressing changed if necessary.  He denies any complications since the procedure.  He continues to take the Bactrim that was prescribed during that visit.  No nausea or vomiting.  No fever or chills.      Other significant problems:  There are no active problems to display for this patient.      PAST MEDICAL, FAMILY AND SOCIAL HISTORY     Medications:  Current Outpatient Medications   Medication   • sulfamethoxazole-trimethoprim (Bactrim DS) 800-160 MG per tablet     No current facility-administered medications for this visit.        Allergies:  ALLERGIES:  No Known Allergies    Past Medical  History/Surgeries:  History reviewed. No pertinent past medical history.    History reviewed. No pertinent surgical history.    Family History:  History reviewed. No pertinent family history.    Social History:  Social History     Tobacco Use   • Smoking status: Current Every Day Smoker     Types: Cigarettes   • Smokeless tobacco: Never Used   Substance Use Topics   • Alcohol use: Not on file       REVIEW OF SYSTEMS     Review of Systems   Skin: Positive for wound.       PHYSICAL  EXAM     Physical Exam  Vitals signs and nursing note reviewed.   Constitutional:       General: He is not in acute distress.     Appearance: Normal appearance. He is well-developed. He is not ill-appearing, toxic-appearing or diaphoretic.   HENT:      Head: Normocephalic and atraumatic.   Eyes:      Conjunctiva/sclera: Conjunctivae normal.   Neck:      Musculoskeletal: Normal range of motion.   Skin:     General: Skin is warm and dry.      Findings: Lesion (Dressing and previous packing was removed.  Inspection of the wound did not reveal any purulent material.  Tissue appeared viable.  Depth of the wound was approximately 1 cm. ) present.   Neurological:      Mental Status: He is alert and oriented to person, place, and time.   Psychiatric:         Behavior: Behavior normal.         ASSESSMENT/PLAN     Ron was seen today for abscess.    Diagnoses and all orders for this visit:    Encounter for wound care - patient was reclined on the exam table in a comfortable position.  After the bandaging and previous packing was removed, curved hemostats were used to repack the wound using iodoform gauze.  Patient tolerated the procedure well.  New bandaging was applied by the nursing staff.  EBL 0. Patient is encouraged to finish the antibiotic given to him at his previous visit.  Home care instructions provided for review.  Recommend re-evaluation of the wound for possible dressing change in 48-72 hours.     If symptoms worsen or fail to improve, patient should follow-up with their PCP.     impaired

## 2024-09-18 NOTE — PROGRESS NOTE ADULT - NS ATTEND AMEND GEN_ALL_CORE FT
Patient seen at bedside, somnolent, appears comfortable.  On exam, patient is NAD, cardiopulmonary exams unremarkable, abdomen soft, NT/ND, extremities without edema.  No labs available for review today.     Assessment and plan:  58-year-old female, AAOx1-2, ambulates with assistance, with a past medical history of diabetes, hypertension, bipolar, and schizophrenia presents to ED for confusion for the past 2 weeks.    # Cognitive dysfunction, likely 2/2 decompensated schizophrenia  # Bipolar disorder  # HTN  # DM    - psych Dr Mcdermott evaluation appreciated, the etiology of her cognitive dysfunction appears to be decompensated schizoaffective disorder due to medication non-adherence   -resumed paliperidone (invega) at 3mg PO and uptitrated to 6mg   -resumed home depakote 500mg at bedtime  -resume home meds  -ARYA NICOLE on board for safe discharge plan. patient is now medically ready, however she is not deemed to be safe alone at home. Also does not qualify for skilled nursing home given intact physical function. Discussing with children  -dvt ppx.

## 2024-09-18 NOTE — PHYSICAL THERAPY INITIAL EVALUATION ADULT - LIVES WITH, PROFILE
prior to recent admission pt lived alone in a rented room , daughter states pt can not go back./alone

## 2024-09-18 NOTE — PHYSICAL THERAPY INITIAL EVALUATION ADULT - GENERAL OBSERVATIONS, REHAB EVAL
Consult received,EMR, radiology and labs reviewed. Patient received supine in bed, Telugu speaking- Int ID used [id number], daughter at bedside. Pt was easily at aroused, c.o of feeling very sleepy and tired.  Patient agreed to EVALUATION from Physical Therapist.

## 2024-09-18 NOTE — PROGRESS NOTE ADULT - SUBJECTIVE AND OBJECTIVE BOX
NP Note discussed with  Primary Attending    Patient is a 58y old  Female who presents with a chief complaint of altered mental status (17 Sep 2024 11:07)      INTERVAL HPI/OVERNIGHT EVENTS: no new complaints    MEDICATIONS  (STANDING):  atorvastatin 40 milliGRAM(s) Oral at bedtime  divalproex  milliGRAM(s) Oral at bedtime  enoxaparin Injectable 40 milliGRAM(s) SubCutaneous every 24 hours  influenza   Vaccine 0.5 milliLiter(s) IntraMuscular once  insulin lispro (ADMELOG) corrective regimen sliding scale   SubCutaneous at bedtime  insulin lispro (ADMELOG) corrective regimen sliding scale   SubCutaneous three times a day before meals  lactated ringers. 1000 milliLiter(s) (80 mL/Hr) IV Continuous <Continuous>  losartan 50 milliGRAM(s) Oral daily  paliperidone ER. 6 milliGRAM(s) Oral <User Schedule>    MEDICATIONS  (PRN):  OLANZapine 2.5 milliGRAM(s) Oral every 6 hours PRN moderate to severe agitation      __________________________________________________  REVIEW OF SYSTEMS:    CONSTITUTIONAL: No fever,   EYES: no acute visual disturbances  NECK: No pain or stiffness  RESPIRATORY: No cough; No shortness of breath  CARDIOVASCULAR: No chest pain, no palpitations  GASTROINTESTINAL: No pain. No nausea or vomiting; No diarrhea   NEUROLOGICAL: No headache or numbness, no tremors  MUSCULOSKELETAL: No joint pain, no muscle pain  GENITOURINARY: no dysuria, no frequency, no hesitancy  PSYCHIATRY: no depression , no anxiety  ALL OTHER  ROS negative        Vital Signs Last 24 Hrs  T(C): 36.4 (18 Sep 2024 05:51), Max: 37.1 (17 Sep 2024 14:46)  T(F): 97.6 (18 Sep 2024 05:51), Max: 98.8 (17 Sep 2024 14:46)  HR: 83 (18 Sep 2024 05:51) (83 - 88)  BP: 148/76 (18 Sep 2024 05:51) (104/67 - 148/76)  BP(mean): 100 (18 Sep 2024 05:51) (85 - 100)  RR: 18 (18 Sep 2024 05:51) (18 - 18)  SpO2: 95% (18 Sep 2024 05:51) (94% - 95%)    Parameters below as of 18 Sep 2024 05:51  Patient On (Oxygen Delivery Method): room air        ________________________________________________  PHYSICAL EXAM:  GENERAL: NAD  HEENT: Normocephalic;  conjunctivae and sclerae clear; moist mucous membranes;   NECK : supple  CHEST/LUNG: Clear to auscultation bilaterally with good air entry   HEART: S1 S2  regular; no murmurs, gallops or rubs  ABDOMEN: Soft, Nontender, Nondistended; Bowel sounds present  EXTREMITIES: no cyanosis; no edema; no calf tenderness  SKIN: warm and dry; no rash  NERVOUS SYSTEM:  Awake and alert; Oriented  to place, person and time ; no new deficits    _________________________________________________  LABS:              CAPILLARY BLOOD GLUCOSE      POCT Blood Glucose.: 108 mg/dL (17 Sep 2024 21:12)  POCT Blood Glucose.: 109 mg/dL (17 Sep 2024 16:33)  POCT Blood Glucose.: 110 mg/dL (17 Sep 2024 11:31)        RADIOLOGY & ADDITIONAL TESTS:    Imaging Personally Reviewed:  YES/NO    Consultant(s) Notes Reviewed:   YES/ No    Care Discussed with Consultants :     Plan of care was discussed with patient and /or primary care giver; all questions and concerns were addressed and care was aligned with patient's wishes.

## 2024-09-19 LAB
GLUCOSE BLDC GLUCOMTR-MCNC: 105 MG/DL — HIGH (ref 70–99)
GLUCOSE BLDC GLUCOMTR-MCNC: 112 MG/DL — HIGH (ref 70–99)
GLUCOSE BLDC GLUCOMTR-MCNC: 123 MG/DL — HIGH (ref 70–99)
GLUCOSE BLDC GLUCOMTR-MCNC: 99 MG/DL — SIGNIFICANT CHANGE UP (ref 70–99)

## 2024-09-19 PROCEDURE — 99232 SBSQ HOSP IP/OBS MODERATE 35: CPT

## 2024-09-19 RX ADMIN — DIVALPROEX SODIUM 500 MILLIGRAM(S): 125 CAPSULE, DELAYED RELEASE ORAL at 21:48

## 2024-09-19 RX ADMIN — PALIPERIDONE 6 MILLIGRAM(S): 3 TABLET, EXTENDED RELEASE ORAL at 12:09

## 2024-09-19 RX ADMIN — Medication 40 MILLIGRAM(S): at 21:48

## 2024-09-19 RX ADMIN — LOSARTAN POTASSIUM 50 MILLIGRAM(S): 50 TABLET ORAL at 05:44

## 2024-09-19 RX ADMIN — ENOXAPARIN SODIUM 40 MILLIGRAM(S): 100 INJECTION SUBCUTANEOUS at 17:38

## 2024-09-19 NOTE — PROGRESS NOTE ADULT - SUBJECTIVE AND OBJECTIVE BOX
NP Note discussed with  Primary Attending    Patient is a 58y old  Female who presents with a chief complaint of altered mental status (18 Sep 2024 08:07)      INTERVAL HPI/OVERNIGHT EVENTS: no acute events overnight    MEDICATIONS  (STANDING):  atorvastatin 40 milliGRAM(s) Oral at bedtime  divalproex  milliGRAM(s) Oral at bedtime  enoxaparin Injectable 40 milliGRAM(s) SubCutaneous every 24 hours  influenza   Vaccine 0.5 milliLiter(s) IntraMuscular once  insulin lispro (ADMELOG) corrective regimen sliding scale   SubCutaneous at bedtime  insulin lispro (ADMELOG) corrective regimen sliding scale   SubCutaneous three times a day before meals  lactated ringers. 1000 milliLiter(s) (80 mL/Hr) IV Continuous <Continuous>  losartan 50 milliGRAM(s) Oral daily  paliperidone ER. 6 milliGRAM(s) Oral <User Schedule>    MEDICATIONS  (PRN):  OLANZapine 2.5 milliGRAM(s) Oral every 6 hours PRN moderate to severe agitation      __________________________________________________  REVIEW OF SYSTEMS:    CONSTITUTIONAL: No fever,   EYES: no acute visual disturbances  NECK: No pain or stiffness  RESPIRATORY: No cough; No shortness of breath  CARDIOVASCULAR: No chest pain, no palpitations  GASTROINTESTINAL: No pain. No nausea or vomiting; No diarrhea   NEUROLOGICAL: No headache or numbness, no tremors  MUSCULOSKELETAL: No joint pain, no muscle pain  GENITOURINARY: no dysuria, no frequency, no hesitancy  PSYCHIATRY: no depression , no anxiety  ALL OTHER  ROS negative        Vital Signs Last 24 Hrs  T(C): 36.9 (19 Sep 2024 05:23), Max: 36.9 (18 Sep 2024 21:20)  T(F): 98.4 (19 Sep 2024 05:23), Max: 98.4 (18 Sep 2024 21:20)  HR: 76 (19 Sep 2024 05:23) (76 - 90)  BP: 121/75 (19 Sep 2024 05:23) (119/72 - 121/75)  BP(mean): 90 (19 Sep 2024 05:23) (90 - 90)  RR: 18 (19 Sep 2024 05:23) (18 - 18)  SpO2: 93% (19 Sep 2024 05:23) (93% - 95%)    Parameters below as of 19 Sep 2024 05:23  Patient On (Oxygen Delivery Method): room air        ________________________________________________  PHYSICAL EXAM:  GENERAL: NAD  HEENT: Normocephalic  NECK : supple  CHEST/LUNG: Clear to auscultation bilaterally  HEART: S1 S2  regular  ABDOMEN: Soft, Nontender, Nondistended; Bowel sounds present  EXTREMITIES: no edema  SKIN: warm and dry; no rash  NERVOUS SYSTEM:  Awake and alert; Oriented  to place and person    _________________________________________________  LABS:              CAPILLARY BLOOD GLUCOSE      POCT Blood Glucose.: 99 mg/dL (19 Sep 2024 11:10)  POCT Blood Glucose.: 105 mg/dL (19 Sep 2024 08:01)  POCT Blood Glucose.: 117 mg/dL (18 Sep 2024 21:34)  POCT Blood Glucose.: 95 mg/dL (18 Sep 2024 16:28)        RADIOLOGY & ADDITIONAL TESTS:    < from: CT Head No Cont (09.11.24 @ 14:44) >  FINDINGS:    HEMISPHERES:  No mass or space occupying lesion.  No acute ischemic   changes or hemorrhagic foci are suggested.  VENTRICLES:  Midline and normal in size.  POSTERIOR FOSSA:  The brain stem and cerebellum are unremarkable.  No CP   angle lesion noted.  EXTRACEREBRAL SPACES:  No subdural or epidural collections are noted.  SKULL BASE AND CALVARIUM:  Appears intact.  No fracture or destructive   lesion is identified.  SINUSES AND MASTOIDS:  Clear.  MISCELLANEOUS:  No orbital or suprasellar abnormality noted.    IMPRESSION:    1)  unremarkable CT study of the brain  2)  clear sinuses and mastoids..    --- Endof Report ---    < end of copied text >    Imaging Personally Reviewed:  YES    Consultant(s) Notes Reviewed:   YES      Plan of care was discussed with patient and /or primary care giver; all questions and concerns were addressed and care was aligned with patient's wishes.

## 2024-09-19 NOTE — PROGRESS NOTE ADULT - NS ATTEND AMEND GEN_ALL_CORE FT
Patient seen at bedside, states she feels okay, denies fever, chills, nausea.  On exam, patient is NAD, cardiopulmonary exams unremarkable, abdomen soft, NT/ND, extremities without edema.  No labs available for review today.     Assessment and plan:  58-year-old female, AAOx1-2, ambulates with assistance, with a past medical history of diabetes, hypertension, bipolar, and schizophrenia presents to ED for confusion for the past 2 weeks.    # Cognitive dysfunction, likely 2/2 decompensated schizophrenia  # Bipolar disorder  # HTN  # DM    - psych Dr Mcdermott evaluation appreciated, the etiology of her cognitive dysfunction appears to be decompensated schizoaffective disorder due to medication non-adherence   -resumed paliperidone (invega) at 3mg PO and uptitrated to 6mg   -resumed home depakote 500mg at bedtime  -resume home meds  -COREY CM on board for safe discharge plan. patient is now medically ready, however she is not deemed to be safe alone at home. Also does not qualify for skilled nursing home given intact physical function. Discussing with children  -dvt ppx.

## 2024-09-20 VITALS
DIASTOLIC BLOOD PRESSURE: 82 MMHG | RESPIRATION RATE: 18 BRPM | OXYGEN SATURATION: 95 % | HEART RATE: 83 BPM | SYSTOLIC BLOOD PRESSURE: 145 MMHG | TEMPERATURE: 98 F

## 2024-09-20 LAB
GLUCOSE BLDC GLUCOMTR-MCNC: 101 MG/DL — HIGH (ref 70–99)
GLUCOSE BLDC GLUCOMTR-MCNC: 106 MG/DL — HIGH (ref 70–99)
GLUCOSE BLDC GLUCOMTR-MCNC: 113 MG/DL — HIGH (ref 70–99)

## 2024-09-20 PROCEDURE — 83735 ASSAY OF MAGNESIUM: CPT

## 2024-09-20 PROCEDURE — 82962 GLUCOSE BLOOD TEST: CPT

## 2024-09-20 PROCEDURE — 80053 COMPREHEN METABOLIC PANEL: CPT

## 2024-09-20 PROCEDURE — 80048 BASIC METABOLIC PNL TOTAL CA: CPT

## 2024-09-20 PROCEDURE — 81003 URINALYSIS AUTO W/O SCOPE: CPT

## 2024-09-20 PROCEDURE — 84484 ASSAY OF TROPONIN QUANT: CPT

## 2024-09-20 PROCEDURE — 99285 EMERGENCY DEPT VISIT HI MDM: CPT

## 2024-09-20 PROCEDURE — 36415 COLL VENOUS BLD VENIPUNCTURE: CPT

## 2024-09-20 PROCEDURE — 84100 ASSAY OF PHOSPHORUS: CPT

## 2024-09-20 PROCEDURE — 84702 CHORIONIC GONADOTROPIN TEST: CPT

## 2024-09-20 PROCEDURE — 85730 THROMBOPLASTIN TIME PARTIAL: CPT

## 2024-09-20 PROCEDURE — 85025 COMPLETE CBC W/AUTO DIFF WBC: CPT

## 2024-09-20 PROCEDURE — 97161 PT EVAL LOW COMPLEX 20 MIN: CPT

## 2024-09-20 PROCEDURE — 70450 CT HEAD/BRAIN W/O DYE: CPT | Mod: MC

## 2024-09-20 PROCEDURE — 85027 COMPLETE CBC AUTOMATED: CPT

## 2024-09-20 PROCEDURE — 83036 HEMOGLOBIN GLYCOSYLATED A1C: CPT

## 2024-09-20 PROCEDURE — 99239 HOSP IP/OBS DSCHRG MGMT >30: CPT

## 2024-09-20 PROCEDURE — 85610 PROTHROMBIN TIME: CPT

## 2024-09-20 RX ORDER — OLANZAPINE 7.5 MG/1
1 TABLET ORAL
Qty: 0 | Refills: 0 | DISCHARGE
Start: 2024-09-20

## 2024-09-20 RX ORDER — SEMAGLUTIDE 1 MG/.5ML
0.5 INJECTION, SOLUTION SUBCUTANEOUS
Refills: 0 | DISCHARGE

## 2024-09-20 RX ORDER — DIVALPROEX SODIUM 125 MG/1
1 CAPSULE, DELAYED RELEASE ORAL
Qty: 0 | Refills: 0 | DISCHARGE
Start: 2024-09-20

## 2024-09-20 RX ORDER — PALIPERIDONE 3 MG/1
1 TABLET, EXTENDED RELEASE ORAL
Qty: 0 | Refills: 0 | DISCHARGE
Start: 2024-09-20

## 2024-09-20 RX ADMIN — ENOXAPARIN SODIUM 40 MILLIGRAM(S): 100 INJECTION SUBCUTANEOUS at 17:20

## 2024-09-20 RX ADMIN — PALIPERIDONE 6 MILLIGRAM(S): 3 TABLET, EXTENDED RELEASE ORAL at 11:56

## 2024-09-20 NOTE — PROGRESS NOTE ADULT - NS ATTEND AMEND GEN_ALL_CORE FT
Patient seen at bedside, states she feels good, denies acute symptoms.  On exam, patient is NAD, cardiopulmonary exams unremarkable, abdomen soft, NT/ND, extremities without edema.  No labs available for review today.     Assessment and plan:  58-year-old female, AAOx1-2, ambulates with assistance, with a past medical history of diabetes, hypertension, bipolar, and schizophrenia presents to ED for confusion for the past 2 weeks.    # Cognitive dysfunction, likely 2/2 decompensated schizophrenia  # Bipolar disorder  # HTN  # DM    - psych Dr Mcdermott evaluation appreciated, the etiology of her cognitive dysfunction appears to be decompensated schizoaffective disorder due to medication non-adherence   -resumed paliperidone (invega) at 3mg PO and uptitrated to 6mg   -resumed home depakote 500mg at bedtime  -resume home meds  -ARYA NICOLE on board for safe discharge plan. patient is now medically ready, however she is not deemed to be safe alone at home. Also does not qualify for skilled nursing home given intact physical function. Discussing with children  -dvt ppx.

## 2024-09-20 NOTE — PROGRESS NOTE ADULT - PROBLEM SELECTOR PROBLEM 2
Chronic schizoaffective disorder

## 2024-09-20 NOTE — DISCHARGE NOTE NURSING/CASE MANAGEMENT/SOCIAL WORK - NSDCPEFALRISK_GEN_ALL_CORE
For information on Fall & Injury Prevention, visit: [email or web address] OR  [email or web address] OR  https://www.cdc.gov/steadi/patient.html

## 2024-09-20 NOTE — PROGRESS NOTE ADULT - PROBLEM SELECTOR PLAN 6
DVT: Lovenox
DVT ppx: Lovenox
DVT: Lovenox
DVT ppx: Lovenox
DVT: Lovenox

## 2024-09-20 NOTE — PROGRESS NOTE ADULT - PROBLEM SELECTOR PLAN 1
P/w worsening confusion for the past two weeks.  -CTH neg   -EKG: NSR   -C/w Invega 6mg daily, Depakote 500 mg PO HS   -C/w PRN: Zyprexa 2.5 mg PO/IM q 6 hrs PRN for moderate to severe agitation.  -likely 2/2 worsening progression of schizoaffective disorder   -Remains confused with no behavioral disturbances  -SW following
P/w worsening confusion for the past two weeks.  -CTH neg   -EKG: NSR   -C/w Invega 6mg daily, Depakote 500 mg PO HS   -C/w PRN: Zyprexa 2.5 mg PO/IM q 6 hrs PRN for moderate to severe agitation.  -likely 2/2 worsening progression of schizoaffective disorder   -Remains confused with no behavioral disturbances  -SW following
p/w worsening confusion for the past two weeks.  Vitals: temp 98.4, HR 83, /77   CTH neg   EKG: NSR   likely worsening progression of schizoaffective disorder   Neurology consulted: Paula, pending recs  Psych consulted: Mynor, pending recs   consulted: As family can no longer take care of the patient
P/w worsening confusion for the past two weeks.  -CTH neg   -EKG: NSR   -C/w Invega 6mg daily, Depakote 500 mg PO HS   -C/w PRN: Zyprexa 2.5 mg PO/IM q 6 hrs PRN for moderate to severe agitation.  -likely 2/2 worsening progression of schizoaffective disorder   -Remains confused with no behavioral disturbances  -SW following
p/w worsening confusion for the past two weeks.  CTH neg   EKG: NSR   Psych note and reccs appreciated  Invega 3 mg PO daily increased to 6mg daily  Cont Depakote 500 mg PO HS.    PRN: Zyprexa 2.5 mg PO/IM q 6 hrs PRN for moderate to severe agitation.  likely worsening progression of schizoaffective disorder   Remains confused with no behavioral disturbances  SW following As family can no longer take care of the patient
P/w worsening confusion for the past two weeks.  -CTH neg   -EKG: NSR   -C/w Invega 6mg daily, Depakote 500 mg PO HS   -C/w PRN: Zyprexa 2.5 mg PO/IM q 6 hrs PRN for moderate to severe agitation.  -likely 2/2 worsening progression of schizoaffective disorder   -Remains confused with no behavioral disturbances  -SW following
p/w worsening confusion for the past two weeks.  Vitals: temp 98.4, HR 83, /77   CTH neg   EKG: NSR   likely worsening progression of schizoaffective disorder   SW consulted: As family can no longer take care of the patient

## 2024-09-20 NOTE — DISCHARGE NOTE NURSING/CASE MANAGEMENT/SOCIAL WORK - PATIENT PORTAL LINK FT
You can access the FollowMyHealth Patient Portal offered by [facility] by registering at the following website: [email or web address]. By joining [facility]’s FollowMyHealth portal, you will also be able to view your health information using other applications (apps) compatible with our system.

## 2024-09-20 NOTE — PROGRESS NOTE ADULT - PROBLEM SELECTOR PLAN 5
hx of dm  on [facility]ard[facility] at home  c/w sliding scale  A1C 6.4
hx of dm on Jardiance at home  -c/w fingersticks and [facility]   - A1C 6.4
hx of dm on Jardiance at home  -c/w fingersticks and [facility]   - A1C 6.4
hx of dm  on Jardiance at home  c/w sliding scale  A1C 6.4
hx of dm  on Jardiance at home  c/w sliding scale  A1C 6.4
hx of dm on Jardiance at home  -c/w fingersticks and [facility]   - A1C 6.4
hx of dm on Jardiance at home  -c/w fingersticks and [facility]   - A1C 6.4

## 2024-09-20 NOTE — PROGRESS NOTE ADULT - SUBJECTIVE AND OBJECTIVE BOX
NP Note discussed with  Primary Attending    Patient is a 58y old  Female who presents with a chief complaint of altered mental status (19 Sep 2024 12:57)      INTERVAL HPI/OVERNIGHT EVENTS: no acute events overnight    MEDICATIONS  (STANDING):  atorvastatin 40 milliGRAM(s) Oral at bedtime  divalproex  milliGRAM(s) Oral at bedtime  enoxaparin Injectable 40 milliGRAM(s) SubCutaneous every 24 hours  influenza   Vaccine 0.5 milliLiter(s) IntraMuscular once  insulin lispro (ADMELOG) corrective regimen sliding scale   SubCutaneous at bedtime  insulin lispro (ADMELOG) corrective regimen sliding scale   SubCutaneous three times a day before meals  lactated ringers. 1000 milliLiter(s) (80 mL/Hr) IV Continuous <Continuous>  losartan 50 milliGRAM(s) Oral daily  paliperidone ER. 6 milliGRAM(s) Oral <User Schedule>    MEDICATIONS  (PRN):  OLANZapine 2.5 milliGRAM(s) Oral every 6 hours PRN moderate to severe agitation      __________________________________________________  REVIEW OF SYSTEMS:    CONSTITUTIONAL: No fever,   EYES: no acute visual disturbances  NECK: No pain or stiffness  RESPIRATORY: No cough; No shortness of breath  CARDIOVASCULAR: No chest pain, no palpitations  GASTROINTESTINAL: No pain. No nausea or vomiting; No diarrhea   NEUROLOGICAL: No headache or numbness, no tremors  MUSCULOSKELETAL: No joint pain, no muscle pain  GENITOURINARY: no dysuria, no frequency, no hesitancy  PSYCHIATRY: no depression , no anxiety  ALL OTHER  ROS negative        Vital Signs Last 24 Hrs  T(C): 37.1 (20 Sep 2024 05:55), Max: 37.1 (20 Sep 2024 05:55)  T(F): 98.7 (20 Sep 2024 05:55), Max: 98.7 (20 Sep 2024 05:55)  HR: 84 (20 Sep 2024 05:55) (84 - 86)  BP: 106/68 (20 Sep 2024 05:55) (101/65 - 106/68)  BP(mean): 81 (20 Sep 2024 05:55) (81 - 81)  RR: 17 (20 Sep 2024 05:55) (17 - 17)  SpO2: 96% (20 Sep 2024 05:55) (96% - 97%)    Parameters below as of 20 Sep 2024 05:55  Patient On (Oxygen Delivery Method): room air        ________________________________________________  PHYSICAL EXAM:  GENERAL: NAD  HEENT: Normocephalic  NECK : supple  CHEST/LUNG: Clear to auscultation bilaterally  HEART: S1 S2  regular  ABDOMEN: Soft, Nontender, Nondistended; Bowel sounds present  EXTREMITIES: no edema  SKIN: warm and dry; no rash  NERVOUS SYSTEM:  Awake and alert; Oriented  to place, person    _________________________________________________  LABS:              CAPILLARY BLOOD GLUCOSE      POCT Blood Glucose.: 101 mg/dL (20 Sep 2024 11:43)  POCT Blood Glucose.: 106 mg/dL (20 Sep 2024 07:46)  POCT Blood Glucose.: 123 mg/dL (19 Sep 2024 21:04)  POCT Blood Glucose.: 112 mg/dL (19 Sep 2024 16:38)        RADIOLOGY & ADDITIONAL TESTS:    < from: CT Head No Cont (09.11.24 @ 14:44) >    FINDINGS:    HEMISPHERES:  No mass or space occupying lesion.  No acute ischemic   changes or hemorrhagic foci are suggested.  VENTRICLES:  Midline and normal in size.  POSTERIOR FOSSA:  The brain stem and cerebellum are unremarkable.  No CP   angle lesion noted.  EXTRACEREBRAL SPACES:  No subdural or epidural collections are noted.  SKULL BASE AND CALVARIUM:  Appears intact.  No fracture or destructive   lesion is identified.  SINUSES AND MASTOIDS:  Clear.  MISCELLANEOUS:  No orbital or suprasellar abnormality noted.    IMPRESSION:    1)  unremarkable CT study of the brain  2)  clear sinuses and mastoids..    --- Endof Report ---    < end of copied text >    Imaging Personally Reviewed:  YES    Consultant(s) Notes Reviewed:   YES      Plan of care was discussed with patient and /or primary care giver; all questions and concerns were addressed and care was aligned with patient's wishes.

## 2024-09-20 NOTE — PROGRESS NOTE ADULT - NS ATTEND OPT1 GEN_ALL_CORE
I independently performed the documented:
I attest my time as attending is greater than 50% of the total combined time spent on qualifying patient care activities by the PA/NP and attending.
I independently performed the documented:
I attest my time as attending is greater than 50% of the total combined time spent on qualifying patient care activities by the PA/NP and attending.
I attest my time as attending is greater than 50% of the total combined time spent on qualifying patient care activities by the PA/NP and attending.
I independently performed the documented:
I independently performed the documented:

## 2024-09-20 NOTE — PROGRESS NOTE ADULT - REASON FOR ADMISSION
altered mental status

## 2024-09-20 NOTE — PROGRESS NOTE ADULT - ASSESSMENT
58-year-old female, AAOx1, ambulates with assistance, with a past medical  history of diabetes, hypertension, bipolar, and schizophrenia presents to ED for confusion for the past 2 weeks.  vitals wnl. Labs wnl. CTH neg.   Patient is being admitted for metabolic encephelopathy of unknown origin but likely from worsening of her psychiatric condition. 
58F PMH bipolar and schizophrenia on monthly paliperidone injections at [facility] p/w 2 weeks of confusion/inability to perform ADLs. Pt reports several episodes of acute agitation and confusion, most recently requiring hospitalization at [facility]. Admission for encephalopathy.    AP  Encephalopathy   Suspect early dementia or decompensated schizophrenia  Schizophrenia  Bipolar  HTN  DM    -Patient’s episode sound like agitation possibly iso of decompensated schizophrenia. After most recent episode, daughter reports she has been laying in bed, soiling herself, and unable to perform normal ADLs. On exam, she is cooperative, AOx2, following commands and answering qs appropriately.  -discussed with psych Dr Mcdermott: appears to be decompensated schizoaffective disorder due to medication non-adherence   -resumed paliperidone (invega) at 3mg PO; will uptitrate to 6mg today  -resumed home depakote 500mg at bedtime  -f/u neuro recs  -resume home meds  -SW, CM eval  -dvt ppx    -pending placement to NH    =================================  I independently reviewed the following tests: N/A  I discussed management with specialists and the plan of care is described above.  I ordered labs and studies: CBC, BMP  I reviewed the following labs to guide my management:                        14.4   8.18  )-----------( 272      ( 15 Sep 2024 05:20 )             43.9     09-15    142  |  108  |  21<H>  ----------------------------<  106<H>  4.0   |  26  |  0.65    Ca    9.2      15 Sep 2024 05:20  Phos  3.7     09-15  Mg     2.2     09-15    
58-year-old female, AAOx1, ambulates with assistance, with a past medical  history of diabetes, hypertension, bipolar, and schizophrenia presents to ED for confusion for the past 2 weeks.  vitals wnl. Labs wnl. CTH neg. Admitted for metabolic encephelopathy of unknown origin but likely from worsening of her psychiatric condition. Pt. followed by psych, suspects  mild to moderate decompensation of her underlying psychotic illness that should stabilize after several days of compliance with antipsychotics. Recc to cont Invega 3 mg PO daily, Cont Depakote 500 mg PO HS. Patient remains calm and cooperative, pending [facility] placement  
58F PMH bipolar and schizophrenia on monthly paliperidone injections at [facility] p/w 2 weeks of confusion/inability to perform ADLs. Pt reports several episodes of acute agitation and confusion, most recently requiring hospitalization at [facility]. Admission for encephalopathy.    AP  Encephalopathy   Suspect early dementia or decompensated schizophrenia  Schizophrenia  Bipolar  HTN  DM    -Patient’s episode sound like agitation possibly iso of decompensated schizophrenia. After most recent episode, daughter reports she has been laying in bed, soiling herself, and unable to perform normal ADLs. On exam, she is cooperative, AOx2, following commands and answering qs appropriately.  -discussed with psych Dr Mcdermott: appears to be decompensated schizoaffective disorder due to medication non-adherence - resume paliperidone (invega) at 3mg PO; will need to slowly uptitrate  -resume home depakote 500mg at bedtime  -f/u neuro recs  -resume home meds  -SW, CM eval  -dvt ppx    -pending placement to NH    =================================  I independently reviewed the following tests: N/A  I discussed management with specialists and the plan of care is described above.  I ordered labs and studies: CBC, BMP  I reviewed the following labs to guide my management:                        14.5   8.77  )-----------( 294      ( 14 Sep 2024 05:30 )             45.1     09-14    142  |  110<H>  |  21<H>  ----------------------------<  112<H>  3.7   |  26  |  0.70    Ca    9.1      14 Sep 2024 05:30    
58-year-old female, AAOx1, ambulates with assistance, with a past medical  history of diabetes, hypertension, bipolar, and schizophrenia presents to ED for confusion for the past 2 weeks.  vitals wnl. Labs wnl. CTH neg. Admitted for metabolic encephelopathy of unknown origin but likely from worsening of her psychiatric condition. Pt. followed by psych, suspects  mild to moderate decompensation of her underlying psychotic illness that should stabilize after several days of compliance with antipsychotics. Recc to cont Invega 3 mg PO daily, Cont Depakote 500 mg PO HS. Patient remains calm and cooperative, pending [facility] placement  
58-year-old female, AAOx1, ambulates with assistance, with a past medical  history of diabetes, hypertension, bipolar, and schizophrenia presents to ED for confusion for the past 2 weeks.  vitals wnl. Labs wnl. CTH neg.   Patient is being admitted for metabolic encephelopathy of unknown origin but likely from worsening of her psychiatric condition. 
58-year-old female, AAOx1, ambulates with assistance, with a past medical  history of diabetes, hypertension, bipolar, and schizophrenia presents to ED for confusion for the past 2 weeks.  vitals wnl. Labs wnl. CTH neg.   Patient is being admitted for metabolic encephelopathy of unknown origin but likely from worsening of her psychiatric condition. Pt. followed by psych, suspects  mild to moderate decompensation of her underlying psychotic illness that should stabilize after several days of compliance with antipsychotics. Recc to cont Invega 3 mg PO daily; can be optimized to 6 mg PO daily during the weekend  Cont Depakote 500 mg PO HS.  PRN: Zyprexa 2.5 mg PO/IM q 6 hrs PRN for moderate to severe agitation.  Pt. is quiet with no behavioral disturbances, confused to time and person, limits interaction with staff to short answers.  SW following for safe discharge planning.  
58-year-old female, AAOx1, ambulates with assistance, with a past medical  history of diabetes, hypertension, bipolar, and schizophrenia presents to ED for confusion for the past 2 weeks.  vitals wnl. Labs wnl. CTH neg.   Patient is being admitted for metabolic encephelopathy of unknown origin but likely from worsening of her psychiatric condition. Pt. followed by psych, suspects  mild to moderate decompensation of her underlying psychotic illness that should stabilize after several days of compliance with antipsychotics. Recc to cont Invega 3 mg PO daily; can be optimized to 6 mg PO daily during the weekend  Cont Depakote 500 mg PO HS.  PRN: Zyprexa 2.5 mg PO/IM q 6 hrs PRN for moderate to severe agitation.  Pt. is quiet with no behavioral disturbances, confused to time and person, limits interaction with staff to short answers.  SW following for safe discharge planning.  9/17: Pt remains calm and in good behavioral control. correspondence with staff limited though following commands.   
58-year-old female, AAOx1, ambulates with assistance, with a past medical  history of diabetes, hypertension, bipolar, and schizophrenia presents to ED for confusion for the past 2 weeks.  vitals wnl. Labs wnl. CTH neg.   Patient is being admitted for metabolic encephelopathy of unknown origin but likely from worsening of her psychiatric condition. Pt. followed by psych, suspects  mild to moderate decompensation of her underlying psychotic illness that should stabilize after several days of compliance with antipsychotics. Recc to cont Invega 3 mg PO daily; can be optimized to 6 mg PO daily during the weekend  Cont Depakote 500 mg PO HS.  PRN: Zyprexa 2.5 mg PO/IM q 6 hrs PRN for moderate to severe agitation.  Pt. is quiet with no behavioral disturbances, confused to time and person, limits interaction with staff to short answers.  SW following for safe discharge planning.  9/17: Pt remains calm and in good behavioral control. correspondence with staff limited though following commands.

## 2024-09-20 NOTE — PROGRESS NOTE ADULT - PROBLEM SELECTOR PLAN 7
Pt is from home  Pt will benefit from SNF  SW following for safe disposition as family endorsing inability to care for pt at home
Pt is from home  Pt will benefit from SNF  PT reccs [facility], pending acceptance  SW following for safe disposition
Pt is from home  Pt will benefit from SNF  SW following for safe disposition as family endorsing inability to care for pt at home
Pt is from home  SW following for safe disposition as family endorsing inability to care for pt at home
Pt is from home  Pt will benefit from SNF  PT pending  SW following for safe disposition   -Family wants to apply for medicaid, will take home if no other option
Pt is from home  Pt will benefit from SNF  PT reccs [facility], pending acceptance  SW following for safe disposition
Pt is from home  Pt will benefit from SNF  PT pending  SW following for safe disposition   -Family wants to apply for medicaid, will take home if no other option

## 2024-09-20 NOTE — PROGRESS NOTE ADULT - PROBLEM SELECTOR PLAN 4
hx of HLD  c/w home med atorvastatin 40mg
hx of HLD  c/w home med atorvastatin 40mg
hx of HLD  c/w home dose of atorvastatin
hx of HLD  c/w home med atorvastatin 40mg
hx of HLD  c/w home med atorvastatin 40mg

## 2024-09-20 NOTE — PROGRESS NOTE ADULT - PROBLEM SELECTOR PLAN 3
hx of htn  c/w home dose of losartan
hx of htn  c/w home med losartan 50mg
hx of htn  c/w home med losartan 50mg
hx of htn  c/w home dose of losartan
hx of htn  c/w home med losartan 50mg
hx of htn  c/w home med losartan 50mg
hx of htn  c/w home dose of losartan